# Patient Record
Sex: MALE | Race: WHITE | NOT HISPANIC OR LATINO | Employment: OTHER | URBAN - METROPOLITAN AREA
[De-identification: names, ages, dates, MRNs, and addresses within clinical notes are randomized per-mention and may not be internally consistent; named-entity substitution may affect disease eponyms.]

---

## 2017-06-20 ENCOUNTER — OFFICE VISIT (OUTPATIENT)
Dept: SLEEP MEDICINE | Facility: CLINIC | Age: 72
End: 2017-06-20
Payer: COMMERCIAL

## 2017-06-20 VITALS
BODY MASS INDEX: 42.66 KG/M2 | WEIGHT: 315 LBS | HEIGHT: 72 IN | DIASTOLIC BLOOD PRESSURE: 85 MMHG | HEART RATE: 57 BPM | OXYGEN SATURATION: 94 % | SYSTOLIC BLOOD PRESSURE: 158 MMHG

## 2017-06-20 DIAGNOSIS — G47.33 OSA (OBSTRUCTIVE SLEEP APNEA): Primary | ICD-10-CM

## 2017-06-20 PROCEDURE — 99214 OFFICE O/P EST MOD 30 MIN: CPT | Performed by: FAMILY MEDICINE

## 2017-06-20 NOTE — MR AVS SNAPSHOT
After Visit Summary   6/20/2017    Quintin Paniagua    MRN: [id number]           Patient Information     Date Of Birth          1945        Visit Information        Provider Department      6/20/2017 3:30 PM Boris Zazueta MD [facility] [place]        Today's Diagnoses     ILIA (obstructive sleep apnea)    -  1      Care Instructions      Your BMI is Body mass index is 47.6 kg/(m^2).  Weight management is a personal decision.  If you are interested in exploring weight loss strategies, the following discussion covers the approaches that may be successful. Body mass index (BMI) is one way to tell whether you are at a healthy weight, overweight, or obese. It measures your weight in relation to your height.  A BMI of 18.5 to 24.9 is in the healthy range. A person with a BMI of 25 to 29.9 is considered overweight, and someone with a BMI of 30 or greater is considered obese. More than two-thirds of American adults are considered overweight or obese.  Being overweight or obese increases the risk for further weight gain. Excess weight may lead to heart disease and diabetes.  Creating and following plans for healthy eating and physical activity may help you improve your health.  Weight control is part of healthy lifestyle and includes exercise, emotional health, and healthy eating habits. Careful eating habits lifelong are the mainstay of weight control. Though there are significant health benefits from weight loss, long-term weight loss with diet alone may be very difficult to achieve- studies show long-term success with dietary management in less than 10% of people. Attaining a healthy weight may be especially difficult to achieve in those with severe obesity. In some cases, medications, devices and surgical management might be considered.  What can you do?  If you are overweight or obese and are interested in methods for weight loss, you should discuss this with your provider.      Consider reducing daily calorie intake by 500 calories.     Keep a food journal.     Avoiding skipping meals, consider cutting portions instead.    Diet combined with exercise helps maintain muscle while optimizing fat loss. Strength training is particularly important for building and maintaining muscle mass. Exercise helps reduce stress, increase energy, and improves fitness. Increasing exercise without diet control, however, may not burn enough calories to loose weight.       Start walking three days a week 10-20 minutes at a time    Work towards walking thirty minutes five days a week     Eventually, increase the speed of your walking for 1-2 minutes at time    In addition, we recommend that you review healthy lifestyles and methods for weight loss available through the National Institutes of Health patient information sites:  http://win.niddk.nih.gov/publications/index.htm    And look into health and wellness programs that may be available through your health insurance provider, employer, local community center, or ella club.    Weight management plan: Patient was referred to their PCP to discuss a diet and exercise plan.      Your Body mass index is 47.6 kg/(m^2).  Weight management is a personal decision.  If you are interested in exploring weight loss strategies, the following discussion covers the approaches that may be successful. Body mass index (BMI) is one way to tell whether you are at a healthy weight, overweight, or obese. It measures your weight in relation to your height.  A BMI of 18.5 to 24.9 is in the healthy range. A person with a BMI of 25 to 29.9 is considered overweight, and someone with a BMI of 30 or greater is considered obese. More than two-thirds of American adults are considered overweight or obese.  Being overweight or obese increases the risk for further weight gain. Excess weight may lead to heart disease and diabetes.  Creating and following plans for healthy eating and  physical activity may help you improve your health.  Weight control is part of healthy lifestyle and includes exercise, emotional health, and healthy eating habits. Careful eating habits lifelong are the mainstay of weight control. Though there are significant health benefits from weight loss, long-term weight loss with diet alone may be very difficult to achieve- studies show long-term success with dietary management in less than 10% of people. Attaining a healthy weight may be especially difficult to achieve in those with severe obesity. In some cases, medications, devices and surgical management might be considered.  What can you do?  If you are overweight or obese and are interested in methods for weight loss, you should discuss this with your provider.     Consider reducing daily calorie intake by 500 calories.     Keep a food journal.     Avoiding skipping meals, consider cutting portions instead.    Diet combined with exercise helps maintain muscle while optimizing fat loss. Strength training is particularly important for building and maintaining muscle mass. Exercise helps reduce stress, increase energy, and improves fitness. Increasing exercise without diet control, however, may not burn enough calories to loose weight.       Start walking three days a week 10-20 minutes at a time    Work towards walking thirty minutes five days a week     Eventually, increase the speed of your walking for 1-2 minutes at time    In addition, we recommend that you review healthy lifestyles and methods for weight loss available through the National Institutes of Health patient information sites:  http://win.niddk.nih.gov/publications/index.htm    And look into health and wellness programs that may be available through your health insurance provider, employer, local community center, or ella club.    Weight management plan: Patient was referred to their PCP to discuss a diet and exercise plan.            Follow-ups after your  visit        Follow-up notes from your care team     Return in 1 year (on 6/20/2018) for PAP follow up.      Who to contact     If you have questions or need follow up information about today's clinic visit or your schedule please contact [facility] directly at [phone].  Normal or non-critical lab and imaging results will be communicated to you by MyChart, letter or phone within 4 business days after the clinic has received the results. If you do not hear from us within 7 days, please contact the clinic through [employer] or phone. If you have a critical or abnormal lab result, we will notify you by phone as soon as possible.  Submit refill requests through [employer] or call your pharmacy and they will forward the refill request to us. Please allow 3 business days for your refill to be completed.          Additional Information About Your Visit        MyChart Information     [employer] gives you secure access to your electronic health record. If you see a primary care provider, you can also send messages to your care team and make appointments. If you have questions, please call your primary care clinic.  If you do not have a primary care provider, please call [phone] and they will assist you.        Care EveryWhere ID     This is your Care EveryWhere ID. This could be used by other organizations to access your [place] medical records  [id number]        Your Vitals Were     Pulse Height Pulse Oximetry BMI (Body Mass Index)          57 1.829 m (6') 94% 47.6 kg/m2         Blood Pressure from Last 3 Encounters:   06/20/17 158/85   05/31/16 157/75   03/31/16 189/82    Weight from Last 3 Encounters:   06/20/17 (!) 159.2 kg (351 lb)   05/31/16 (!) 152 kg (335 lb 3.2 oz)   03/31/16 (!) 157.4 kg (347 lb)              We Performed the Following     Sleep Comprehensive DME        Primary Care Provider Office Phone # Fax #    Steven Duane Semmler, [phone]       [facility]   [address] [address]        Equal Access to Services     LONDONJESUS MANUEL MARISELA : Hadii milagro carrizales manasdarlene Christopherali, walosda lukitkaelynha, qalandonta kajose guadalupedavid natoinneymardavid, esperanza alvaradobyronkaykay houser. So [facility] [phone].    ATENCIÓN: Si habla español, tiene a ascencio disposición servicios gratuitos de asistencia lingüística. [facility] [phone].    We comply with applicable federal civil rights laws and [place] laws. We do not discriminate on the basis of race, color, national origin, age, disability sex, sexual orientation or gender identity.            Thank you!     Thank you for choosing [facility] [place]  for your care. Our goal is always to provide you with excellent care. Hearing back from our patients is one way we can continue to improve our services. Please take a few minutes to complete the written survey that you may receive in the mail after your visit with us. Thank you!             Your Updated Medication List - Protect others around you: Learn how to safely use, store and throw away your medicines at www.disposemymeds.org.          This list is accurate as of: 6/20/17 11:59 PM.  Always use your most recent med list.                   Brand Name Dispense Instructions for use Diagnosis    * amiodarone 200 MG tablet    PACERONE/CODARONE     Take 200 mg by mouth        * amiodarone 200 MG tablet    PACERONE/CODARONE          * amLODIPine 5 MG tablet    NORVASC     Take 5 mg by mouth        * amLODIPine 5 MG tablet    NORVASC          aspirin EC 81 MG EC tablet      Take 81 mg by mouth        atorvastatin 80 MG tablet    LIPITOR     Take 80 mg by mouth        BYDUREON 2 MG pen   Generic drug:  exenatide ER           cloNIDine 0.2 MG tablet    CATAPRES     Take 0.2 mg by mouth        finasteride 5 MG tablet    PROSCAR     Take 5 mg by mouth        furosemide 20 MG tablet    LASIX     Take 20 mg by mouth        insulin glargine 100 UNIT/ML injection    LANTUS     Inject 46 units  subcutaneous twice daily at 8am and 8pm. E11.65        insulin pen needle 31G X 8 MM      For administering insulin at home.        isosorbide mononitrate 60 MG 24 hr tablet    IMDUR     Take 60 mg by mouth        liraglutide 18 MG/3ML soln    VICTOZA     Inject 1.2 mg Subcutaneous        lisinopril 20 MG tablet    PRINIVIL/ZESTRIL     Take 20 mg by mouth        metoprolol 100 MG tablet    LOPRESSOR     Take 100 mg by mouth        Miconazole Nitrate 2 % ointment      Apply to both groins and abdominal fold twice a day        nitroglycerin 0.4 MG sublingual tablet    NITROSTAT     Place 0.4 mg under the tongue        NovoLOG FLEXPEN 100 UNIT/ML injection   Generic drug:  insulin aspart           order for DME      Equipment being ordered: CPAP Patient Quintin Paniagua was set up at [facility]  on April 7, 2016. Patient received a Resmed AirSense 10 CPAP. Pressures were set at 13 cm H2O.   Patient?s ramp is 5 cm H2O for Off and FLEX/EPR is 2.  Patient received a Resmed Mask name: GREEN FX  Pillow mask Size Medium, heated tubing and heated humidifier.  Patient is enrolled in the STM Program and does need to meet compliance. Patient has a follow up on MAY 31, 2016 with Dr. Zazueta.        sertraline 50 MG tablet    ZOLOFT     Take 50 mg by mouth        tamsulosin 0.4 MG capsule    FLOMAX     Take 0.4 mg by mouth        warfarin 10 MG tablet    COUMADIN          * Notice:  This list has 4 medication(s) that are the same as other medications prescribed for you. Read the directions carefully, and ask your doctor or other care provider to review them with you.

## 2017-06-20 NOTE — NURSING NOTE
Chief Complaint   Patient presents with     CPAP Follow Up     Follow up for C-Pap compliance.       Initial /76  Pulse 57  Ht 1.829 m (6')  Wt (!) 159.2 kg (351 lb)  SpO2 94%  BMI 47.6 kg/m2 Estimated body mass index is 47.6 kg/(m^2) as calculated from the following:    Height as of this encounter: 1.829 m (6').    Weight as of this encounter: 159.2 kg (351 lb).  Medication Reconciliation: complete

## 2017-06-20 NOTE — PATIENT INSTRUCTIONS

## 2017-06-24 NOTE — PROGRESS NOTES
Obstructive Sleep Apnea - PAP Follow-Up Visit:    Chief Complaint   Patient presents with     CPAP Follow Up     Follow up for C-Pap compliance.       Quintin Paniagua comes in today for annual follow-up of severe ILIA treated with CPAP of 13 cm H2O.  Pertinent PMHx of atrial fibrillation (warfarin, amiodarone), s/p aortic valve replacement, s/p unilateral nephrectomy for RCC, CKD, CAD, DM (presumed type 2).     Overall, doing well with his CPAP and has no concerns today.     CPAP download from 5/21/2017 - 6/19/2017 on set pressure 13 cm H2O.  Average daily usage of 9:42.  AHI 0.8.     Prior Sleep Testing:  3/12/2010 - Split-night PSG.  Weight 335 lbs, RDI 53.4, bravo desat 89%, CPAP 9 effective      Problem List:  Patient Active Problem List    Diagnosis Date Noted     Anemia 07/27/2014     Priority: Medium     Endocarditis 07/25/2014     Priority: Medium     Streptococcus agalactiae sero group B 7/27/2014        ILIA (obstructive sleep apnea)- moderate- severe (AHI 29) 07/25/2014     Priority: Medium     Diagnosed 2010 [facility] (335#). AHI 29.3, RDI 53, Lo2 89%. CPAP 12cm effective lateral REM       Sepsis (H) 07/25/2014     Priority: Medium     Problem list name updated by automated process. Provider to review       Atrial fibrillation (H)      Paroxysmal atrial fibrillation. Hospitalized [facility] 7/04 with rapid ventricular response. paroxysmal atrial fibrillation with rapid ventricular response during hospitalization for UTI at [facility] 7/2013. S/p cardioversion 7/14.        CAD (coronary artery disease) 08/26/2013     cardiac arrest this summer 2013 hospitalized at [facility]. S/p pacemaker defibrillator, 80% LAD stenosis s/p stenting 6/13       Hyperlipidemia 08/22/2013     Essential hypertension 08/05/2013     Automatic implantable cardioverter-defibrillator in situ 07/05/2013     Overview: iKONVERSE dual   and model: [email or web address] Energen E142  Date of implant: 7/5/13  Indication for device:  cardiac arrest  Problem list name updated by automated process. Provider to review       Edema 11/19/2012     CKD (chronic kidney disease) stage 3, GFR 30-59 ml/min 11/14/2012     History of kidney cancer 11/14/2012     Renal cell (clear cell) Tiny class II, S/p right nephrectomy       S/P aortic valve replacement 11/14/2012     Aortic insufficiency, secondary to bicuspid aortic valve, status post aortic valve replacement 9/2004       Morbid obesity (H) 11/14/2012     HTN (hypertension) 11/14/2012     Diabetes mellitus, type 2 (H) 11/14/2012     Septic arthritis of knee (H) 11/14/2012     right TKA complicated by MRSA and enterococcus infection and MRSA bacteremia, treated with two stage procedure with stage one on 11/2/2012, but had to go back to the or 3 days later for continued drainage, treated with vancomycin but changed to Daptomycin due to worsening renal function with planned antibiotics until 12/20/2012, s/p stage 2 done redo TKA 1/2013       External hemorrhoids 10/03/2012     Encounter for counseling 09/24/2012     Overview:   Patient has identified Health Care Agent(s): Yes  Add Health Care Agents: Yes    Health Care Agent(s):  Primary Health Care Agent: Kimberly Guan Relationship: wife Phone: [phone]   Secondary Health Care Agent:  Relationship:  Phone:    Conservator:  Relationship:  Phone:    Guardian: Relationship:  Phone:      Patient has Advance Care Plan Documents (Health Care Directive, POLST): Yes    Advance Care Plan Documents:  POLST Form     Patient has identified Specific Treatment Preferences: Yes   Specific Treatment Preferences: a.) Code Status:  CPR/Attempt Resuscitation        Hyperplasia of prostate without lower urinary tract symptoms (LUTS) 10/10/2007          /85  Pulse 57  Ht 1.829 m (6')  Wt (!) 159.2 kg (351 lb)  SpO2 94%  BMI 47.6 kg/m2    Impression/Plan:    1.)  Severe ILIA, treated with CPAP 13 cm H2O   - Appears well controlled on current CPAP settings  per patient and CPAP download   - Continue on current settings     Quintin Paniagua will follow up in about 1 year(s).     Twenty-five minutes spent with patient, all of which were spent face-to-face counseling, consulting, coordinating plan of care.      Boris Zazueta MD, MD    CC:  Semmler, Steven Duane

## 2018-04-13 ENCOUNTER — NURSE TRIAGE (OUTPATIENT)
Dept: NURSING | Facility: CLINIC | Age: 73
End: 2018-04-13

## 2018-04-14 NOTE — TELEPHONE ENCOUNTER
Reason for Disposition    Sensation of something still in the wound  (i.e., needle broke off)    Protocols used: NEEDLESTICK-ADULT-AH  After giving himself Novolog insulin, the needle couldn't be found.  Patient doesn't feel any discomfort.  He doesn't want to come in, but if he becomes uncomfortable he will.  Shelbi Mullen RN  [facility]

## 2018-11-12 DIAGNOSIS — I10 ESSENTIAL HYPERTENSION: Chronic | ICD-10-CM

## 2018-11-12 DIAGNOSIS — G47.33 OSA (OBSTRUCTIVE SLEEP APNEA): Primary | Chronic | ICD-10-CM

## 2019-11-06 ENCOUNTER — HEALTH MAINTENANCE LETTER (OUTPATIENT)
Age: 74
End: 2019-11-06

## 2020-01-14 RX ORDER — ISOSORBIDE MONONITRATE 60 MG/1
60 TABLET, EXTENDED RELEASE ORAL
Status: ON HOLD | COMMUNITY
Start: 2017-02-07 | End: 2020-01-20

## 2020-01-14 RX ORDER — FINASTERIDE 5 MG/1
5 TABLET, FILM COATED ORAL
Status: ON HOLD | COMMUNITY
Start: 2016-09-28 | End: 2020-01-20

## 2020-01-14 RX ORDER — AMLODIPINE BESYLATE 5 MG/1
5 TABLET ORAL
Status: ON HOLD | COMMUNITY
Start: 2016-09-28 | End: 2020-01-20

## 2020-01-14 RX ORDER — PENICILLIN V POTASSIUM 500 MG/1
TABLET, FILM COATED ORAL
COMMUNITY
Start: 2016-10-12

## 2020-01-14 RX ORDER — WARFARIN SODIUM 5 MG/1
TABLET ORAL
COMMUNITY
Start: 2017-05-09

## 2020-01-14 RX ORDER — NITROGLYCERIN 0.4 MG/1
0.4 TABLET SUBLINGUAL
Status: ON HOLD | COMMUNITY
Start: 2017-01-17 | End: 2020-01-20

## 2020-01-14 RX ORDER — TAMSULOSIN HYDROCHLORIDE 0.4 MG/1
0.4 CAPSULE ORAL
COMMUNITY
Start: 2016-09-28

## 2020-01-14 RX ORDER — NYSTATIN 100000/ML
SUSPENSION, ORAL (FINAL DOSE FORM) ORAL
COMMUNITY
Start: 2016-10-12

## 2020-01-14 RX ORDER — BUMETANIDE 2 MG/1
4 TABLET ORAL DAILY
COMMUNITY

## 2020-01-14 RX ORDER — FLUTICASONE PROPIONATE 50 MCG
1 SPRAY, SUSPENSION (ML) NASAL
Status: ON HOLD | COMMUNITY
Start: 2017-01-17 | End: 2020-01-20

## 2020-01-14 RX ORDER — ACETAMINOPHEN 325 MG/1
325-650 TABLET ORAL
COMMUNITY
Start: 2012-09-19

## 2020-01-14 RX ORDER — CLONIDINE HYDROCHLORIDE 0.2 MG/1
0.2 TABLET ORAL
COMMUNITY
Start: 2017-04-10

## 2020-01-14 RX ORDER — LISINOPRIL 20 MG/1
20 TABLET ORAL
Status: ON HOLD | COMMUNITY
Start: 2016-09-28 | End: 2020-01-20

## 2020-01-14 RX ORDER — FUROSEMIDE 20 MG
20 TABLET ORAL
COMMUNITY
Start: 2016-09-28 | End: 2020-01-14

## 2020-01-14 RX ORDER — ATORVASTATIN CALCIUM 80 MG/1
80 TABLET, FILM COATED ORAL
Status: ON HOLD | COMMUNITY
Start: 2017-04-27 | End: 2020-01-20

## 2020-01-17 ENCOUNTER — ANESTHESIA EVENT (OUTPATIENT)
Dept: SURGERY | Facility: CLINIC | Age: 75
End: 2020-01-17
Payer: COMMERCIAL

## 2020-01-17 ASSESSMENT — ENCOUNTER SYMPTOMS: DYSRHYTHMIAS: 1

## 2020-01-17 NOTE — ANESTHESIA PREPROCEDURE EVALUATION
Anesthesia Pre-Procedure Evaluation    Patient: Quintin Paniagua   MRN: [id number] : 1945          Preoperative Diagnosis: Cataract [H26.9]    Procedure(s):  Cataract Removal with Implant    Past Medical History:   Diagnosis Date     Anemia due to blood loss, acute 2012     Calculus in bladder 2012     Cardiac arrest (H) 2013    Overview:  S/P CPR and defibrillation with an AED with ROSC.      Sepsis 2012    infected right TKA     UTI (urinary tract infection) 2013     Past Surgical History:   Procedure Laterality Date     BACK SURGERY      ,      CARDIAC SURGERY      aortic valve replacement     COLONOSCOPY  9/15/2011    Procedure:COMBINED COLONOSCOPY, REMOVE TUMOR/POLYP/LESION BY SNARE; Surgeon:MADINA NELSON; Location:[facility]     GENITOURINARY SURGERY      s/p right nephrectomy     ORTHOPEDIC SURGERY  0890-2113    knee replacement, complicated by infection       Anesthesia Evaluation     . Pt has had prior anesthetic.            ROS/MED HX    ENT/Pulmonary:     (+)sleep apnea, uses CPAP , . .    Neurologic:       Cardiovascular:     (+) Dyslipidemia, hypertension--CAD, --. : . . . :. dysrhythmias a-fib, .       METS/Exercise Tolerance:     Hematologic:     (+) Anemia, -      Musculoskeletal:   (+) arthritis,  -       GI/Hepatic:         Renal/Genitourinary:     (+) chronic renal disease, type: CRI, Other Renal/ Genitourinary, history of kidney cancer      Endo:     (+) type I DM, Obesity, .      Psychiatric:         Infectious Disease:         Malignancy:         Other:                          Physical Exam  Normal systems: cardiovascular, pulmonary and dental    Airway   Mallampati: I  TM distance: >3 FB  Neck ROM: full    Dental     Cardiovascular   Rhythm and rate: regular and normal      Pulmonary    breath sounds clear to auscultation            Lab Results   Component Value Date    WBC 7.0 2014    HGB 13.3 2014    HCT 39.1 (L) 2014      09/01/2014    CRP 3.6 08/25/2014    SED 17 08/25/2014     09/01/2014    POTASSIUM 4.4 09/01/2014    CHLORIDE 104 09/01/2014    CO2 26 09/01/2014    BUN 23 09/01/2014    CR 1.21 09/01/2014     (H) 09/01/2014    MICHELE 8.3 (L) 09/01/2014    ALBUMIN 3.1 (L) 09/01/2014    PROTTOTAL 7.4 09/01/2014    ALT 24 09/01/2014    AST 17 09/01/2014    ALKPHOS 88 09/01/2014    BILITOTAL 0.5 09/01/2014    LIPASE 45 07/24/2014    PTT 44 (H) 08/16/2011    INR 2.45 (H) 09/01/2014    TSH 4.05 07/19/2004    T4 0.95 07/19/2004       Preop Vitals  BP Readings from Last 3 Encounters:   06/20/17 158/85   05/31/16 157/75   03/31/16 189/82    Pulse Readings from Last 3 Encounters:   06/20/17 57   05/31/16 60   03/31/16 60      Resp Readings from Last 3 Encounters:   09/04/14 18   09/01/14 8   08/25/14 16    SpO2 Readings from Last 3 Encounters:   06/20/17 94%   05/31/16 95%   03/31/16 93%      Temp Readings from Last 1 Encounters:   05/31/16 36.3  C (97.3  F) (Tympanic)    Ht Readings from Last 1 Encounters:   06/20/17 1.829 m (6')      Wt Readings from Last 1 Encounters:   06/20/17 (!) 159.2 kg (351 lb)    Estimated body mass index is 47.6 kg/m  as calculated from the following:    Height as of 6/20/17: 1.829 m (6').    Weight as of 6/20/17: 159.2 kg (351 lb).       Anesthesia Plan      History & Physical Review  History and physical reviewed and following examination; no interval change.    ASA Status:  4 .    NPO Status:  > 6 hours    Plan for MAC Reason for MAC:  Deep or markedly invasive procedure (G8)         Postoperative Care      Consents  Anesthetic plan, risks, benefits and alternatives discussed with:  Patient..                 KRIS Redmond CRNA

## 2020-01-17 NOTE — H&P
[facility]  TOTAL EYE CARE  [address]  [facility] [place] [address]  [phone]  Dept: [phone]    OPHTHALMOLOGY PRE-OPERATIVE  HISTORY AND PHYSICAL    DATE OF H/P:  2019    DATE OF SURGERY:  2020  PROCEDURE:  Procedure(s):  Cataract Removal with Implant, Right Eye  LENS IMPLANT:  [id number] +23.5  REFRACTIVE GOAL:  PL Sph  SURGEON:  Juan Manuel Pennington MD    ANESTHESIA:  TOPICAL / MAC    OR CASE REQUIREMENTS:    DEMOGRAPHICS:  Demographic Information on Quintin Paniagua:    Quintin Paniagua  Gender: male  : 1945  [address]  [facility] [id number] [phone] (home)     Medical Record: [id number]  Social Security Number: [id number]  Pharmacy: [facility][id number] [address] AT [address]  Primary Care Provider: Semmler, Steven Duane    Parent's names are: Data Unavailable (mother) and Data Unavailable (father).    Insurance: Payor: MEDICA / Plan: MED[employer] ADV[employer]IONS / Product Type: HMO /     OCULAR HISTORY:  Cataracts, each eye.    HISTORIES:  Past Medical History:   Diagnosis Date     Anemia due to blood loss, acute 2012     Calculus in bladder 2012     Cardiac arrest (H) 2013    Overview:  S/P CPR and defibrillation with an AED with ROSC.      Sepsis 2012    infected right TKA     UTI (urinary tract infection) 2013       Past Surgical History:   Procedure Laterality Date     BACK SURGERY      1978     CARDIAC SURGERY      aortic valve replacement     COLONOSCOPY  9/15/2011    Procedure:COMBINED COLONOSCOPY, REMOVE TUMOR/POLYP/LESION BY SNARE; Surgeon:MADINA NELSON; Location:[facility]     GENITOURINARY SURGERY      s/p right nephrectomy     ORTHOPEDIC SURGERY  8439-3711    knee replacement, complicated by infection       Family History   Problem Relation Age of Onset     Heart Disease Mother      Heart Disease Father        Social History     Tobacco Use      Smoking status: Former Smoker     Smokeless tobacco: Never Used   Substance Use Topics     Alcohol use: Yes       MEDICATIONS:  No current facility-administered medications for this encounter.      Current Outpatient Medications   Medication Sig     acetaminophen (TYLENOL) 325 MG tablet Take 325-650 mg by mouth     amLODIPine (NORVASC) 5 MG tablet Take 5 mg by mouth     atorvastatin (LIPITOR) 80 MG tablet Take 80 mg by mouth     bumetanide (BUMEX) 2 MG tablet Take 4 mg by mouth daily     cloNIDine (CATAPRES) 0.2 MG tablet Take 0.2 mg by mouth     finasteride (PROSCAR) 5 MG tablet Take 5 mg by mouth     fluticasone (FLONASE) 50 MCG/ACT nasal spray 1 spray     insulin aspart (NOVOLOG PEN) 100 UNIT/ML pen INJECT THREE TIMES DAILY BEFORE MEALS, 16 units before each meal plus more for a high carb meal (UP TO 70 UNITS DAILY)     insulin degludec (TRESIBA FLEXTOUCH) 200 UNIT/ML pen Use as directed.     isosorbide mononitrate (IMDUR) 60 MG 24 hr tablet Take 60 mg by mouth     lisinopril (PRINIVIL/ZESTRIL) 20 MG tablet Take 20 mg by mouth     nitroGLYcerin (NITROSTAT) 0.4 MG sublingual tablet Place 0.4 mg under the tongue     nystatin (MYCOSTATIN) 087068 UNIT/ML suspension Apply to groins and lower abdominal fold twice a day     penicillin V (VEETID) 500 MG tablet Use as directed.     sertraline (ZOLOFT) 50 MG tablet Take 50 mg by mouth     tamsulosin (FLOMAX) 0.4 MG capsule Take 0.4 mg by mouth     warfarin ANTICOAGULANT (COUMADIN) 5 MG tablet Take by mouth 5 mg on Monday's and Thursday's and 7.5 mg all other days or as directed     amiodarone (PACERONE/CODARONE) 200 MG tablet Take 200 mg by mouth     amiodarone (PACERONE/CODARONE) 200 MG tablet      amLODIPine (NORVASC) 5 MG tablet Take 5 mg by mouth     amLODIPine (NORVASC) 5 MG tablet      aspirin EC 81 MG tablet Take 81 mg by mouth     atorvastatin (LIPITOR) 80 MG tablet Take 80 mg by mouth     finasteride (PROSCAR) 5 MG tablet Take 5 mg by mouth     insulin  glargine (LANTUS) 100 UNIT/ML PEN Inject 46 units subcutaneous twice daily at 8am and 8pm. E11.65     insulin pen needle 31G X 8 MM For administering insulin at home.     isosorbide mononitrate (IMDUR) 60 MG 24 hr tablet Take 60 mg by mouth     liraglutide (VICTOZA) 18 MG/3ML soln Inject 1.2 mg Subcutaneous     lisinopril (PRINIVIL,ZESTRIL) 20 MG tablet Take 20 mg by mouth     metoprolol (LOPRESSOR) 100 MG tablet Take 100 mg by mouth     Miconazole Nitrate 2 % OINT Apply to both groins and abdominal fold twice a day     nitroglycerin (NITROSTAT) 0.4 MG SL tablet Place 0.4 mg under the tongue     NOVOLOG FLEXPEN 100 UNIT/ML soln      order for DME Equipment being ordered: CPAP Patient Quintin Paniagua was set up at [facility]  on April 7, 2016. Patient received a Resmed AirSense 10 CPAP. Pressures were set at 13 cm H2O.   Patient s ramp is 5 cm H2O for Off and FLEX/EPR is 2.  Patient received a Resmed Mask name: GREEN FX  Pillow mask Size Medium, heated tubing and heated humidifier.  Patient is enrolled in the STM Program and does need to meet compliance. Patient has a follow up on MAY 31, 2016 with Dr. Zazueta.       ALLERGIES:     Allergies   Allergen Reactions     Liraglutide      Other reaction(s): GI Upset       PERTINENT SYSTEMS REVIEW:    1. Yes: CAD, Valvular heart disease - Do you have a history of heart attack, stroke, stent, bypass or surgery on an artery in the head, neck, heart or legs?  2. No - Do you ever have any pain or discomfort in your chest?  3. No - Do you have a history of  Heart Failure?  4. No - Are you troubled by shortness of breath when walking: On the level, up a slight hill or at night?  5. No - Do you currently have a cold, bronchitis or other respiratory infection?  6. No - Do you have a cough, shortness of breath or wheezing?  7. No - Do you sometimes get pains in the calves of your legs when you walk?  8. No - Do you or anyone in your family have previous history of blood clots?  9.  Yes: on Coumadin - Do you or does anyone in your family have a serious bleeding problem such as prolonged bleeding following surgeries or cuts?  10. No - Have you ever had problems with anemia or been told to take iron pills?  11. No - Have you had any abnormal blood loss such as black, tarry or bloody stools, or abnormal vaginal bleeding?  12. No - Have you ever had a blood transfusion?  13. No - Have you or any of your relatives ever had problems with anesthesia?  14. No - Do you have sleep apnea, excessive snoring or daytime drowsiness?  15. Yes: s/p AVR - Do you have any prosthetic heart valves?  16. Yes: knee replacement - Do you have prosthetic joints?    EXAMINATION:  Vitals were reviewed                     Vison:  Va, right - 20/200, left - 20/100;   BAT, left - 20/200;  HEENT:  Cataract, otherwise unremarkable.  LUNGS:  Clear  CV:  Regular rate and rhythm without murmur  ABD:  Soft and nontender  NEURO:  Alert and nonfocal    IMPRESSION:  Patient cleared for ophthalmic surgery.  Low risk with monitored, light sedation.  I have assessed the patient's DVT risk, and no additional orders necessary.    PLAN:  Procedure(s):  Cataract Removal with Implant, Right Eye      Juan Manuel Pennington MD

## 2020-01-20 ENCOUNTER — ANESTHESIA (OUTPATIENT)
Dept: SURGERY | Facility: CLINIC | Age: 75
End: 2020-01-20
Payer: COMMERCIAL

## 2020-01-20 ENCOUNTER — HOSPITAL ENCOUNTER (OUTPATIENT)
Facility: CLINIC | Age: 75
Discharge: HOME | End: 2020-01-20
Attending: OPHTHALMOLOGY | Admitting: OPHTHALMOLOGY
Payer: COMMERCIAL

## 2020-01-20 VITALS
TEMPERATURE: 97.5 F | BODY MASS INDEX: 44.1 KG/M2 | HEART RATE: 57 BPM | DIASTOLIC BLOOD PRESSURE: 70 MMHG | OXYGEN SATURATION: 93 % | SYSTOLIC BLOOD PRESSURE: 153 MMHG | RESPIRATION RATE: 20 BRPM | HEIGHT: 71 IN | WEIGHT: 315 LBS

## 2020-01-20 LAB — GLUCOSE BLDC GLUCOMTR-MCNC: 107 MG/DL (ref 70–99)

## 2020-01-20 PROCEDURE — 25000125 ZZHC RX 250: Performed by: OPHTHALMOLOGY

## 2020-01-20 PROCEDURE — 71000022 ZZH RECOVERY CATRACT PACKAGE: Performed by: OPHTHALMOLOGY

## 2020-01-20 PROCEDURE — 25000125 ZZHC RX 250: Performed by: NURSE ANESTHETIST, CERTIFIED REGISTERED

## 2020-01-20 PROCEDURE — 25000128 H RX IP 250 OP 636: Performed by: OPHTHALMOLOGY

## 2020-01-20 PROCEDURE — 25000128 H RX IP 250 OP 636: Performed by: NURSE ANESTHETIST, CERTIFIED REGISTERED

## 2020-01-20 PROCEDURE — 37000012 ZZH ANESTHESIA CATARACT PACKAGE: Performed by: OPHTHALMOLOGY

## 2020-01-20 PROCEDURE — 25800030 ZZH RX IP 258 OP 636: Performed by: NURSE ANESTHETIST, CERTIFIED REGISTERED

## 2020-01-20 PROCEDURE — 36000025 ZZH CATARACT SURGICAL PACKAGE: Performed by: OPHTHALMOLOGY

## 2020-01-20 PROCEDURE — V2632 POST CHMBR INTRAOCULAR LENS: HCPCS | Performed by: OPHTHALMOLOGY

## 2020-01-20 PROCEDURE — 82962 GLUCOSE BLOOD TEST: CPT

## 2020-01-20 DEVICE — EYE IMP IOL AMO PCL TECNIS ZCB00 23.5: Type: IMPLANTABLE DEVICE | Site: EYE | Status: FUNCTIONAL

## 2020-01-20 RX ORDER — LIDOCAINE HYDROCHLORIDE 20 MG/ML
JELLY TOPICAL PRN
Status: DISCONTINUED | OUTPATIENT
Start: 2020-01-20 | End: 2020-01-20 | Stop reason: HOSPADM

## 2020-01-20 RX ORDER — PROPOFOL 10 MG/ML
INJECTION, EMULSION INTRAVENOUS PRN
Status: DISCONTINUED | OUTPATIENT
Start: 2020-01-20 | End: 2020-01-20

## 2020-01-20 RX ORDER — SODIUM CHLORIDE, SODIUM LACTATE, POTASSIUM CHLORIDE, CALCIUM CHLORIDE 600; 310; 30; 20 MG/100ML; MG/100ML; MG/100ML; MG/100ML
INJECTION, SOLUTION INTRAVENOUS CONTINUOUS
Status: DISCONTINUED | OUTPATIENT
Start: 2020-01-20 | End: 2020-01-20 | Stop reason: HOSPADM

## 2020-01-20 RX ORDER — SODIUM CHLORIDE, SODIUM LACTATE, POTASSIUM CHLORIDE, CALCIUM CHLORIDE 600; 310; 30; 20 MG/100ML; MG/100ML; MG/100ML; MG/100ML
INJECTION, SOLUTION INTRAVENOUS CONTINUOUS
Status: CANCELLED | OUTPATIENT
Start: 2020-01-20

## 2020-01-20 RX ORDER — PHENYLEPHRINE HYDROCHLORIDE 25 MG/ML
1 SOLUTION/ DROPS OPHTHALMIC
Status: COMPLETED | OUTPATIENT
Start: 2020-01-20 | End: 2020-01-20

## 2020-01-20 RX ORDER — LIDOCAINE 40 MG/G
CREAM TOPICAL
Status: DISCONTINUED | OUTPATIENT
Start: 2020-01-20 | End: 2020-01-20 | Stop reason: HOSPADM

## 2020-01-20 RX ORDER — CYCLOPENTOLATE HYDROCHLORIDE 10 MG/ML
1 SOLUTION/ DROPS OPHTHALMIC
Status: COMPLETED | OUTPATIENT
Start: 2020-01-20 | End: 2020-01-20

## 2020-01-20 RX ORDER — TROPICAMIDE 10 MG/ML
1 SOLUTION/ DROPS OPHTHALMIC
Status: COMPLETED | OUTPATIENT
Start: 2020-01-20 | End: 2020-01-20

## 2020-01-20 RX ORDER — PROPARACAINE HYDROCHLORIDE 5 MG/ML
SOLUTION/ DROPS OPHTHALMIC PRN
Status: DISCONTINUED | OUTPATIENT
Start: 2020-01-20 | End: 2020-01-20 | Stop reason: HOSPADM

## 2020-01-20 RX ADMIN — CYCLOPENTOLATE HYDROCHLORIDE 1 DROP: 10 SOLUTION/ DROPS OPHTHALMIC at 07:47

## 2020-01-20 RX ADMIN — TROPICAMIDE 1 DROP: 10 SOLUTION/ DROPS OPHTHALMIC at 07:47

## 2020-01-20 RX ADMIN — LIDOCAINE HYDROCHLORIDE 1 ML: 10 INJECTION, SOLUTION EPIDURAL; INFILTRATION; INTRACAUDAL; PERINEURAL at 08:23

## 2020-01-20 RX ADMIN — SODIUM CHLORIDE, POTASSIUM CHLORIDE, SODIUM LACTATE AND CALCIUM CHLORIDE: 600; 310; 30; 20 INJECTION, SOLUTION INTRAVENOUS at 08:23

## 2020-01-20 RX ADMIN — CYCLOPENTOLATE HYDROCHLORIDE 1 DROP: 10 SOLUTION/ DROPS OPHTHALMIC at 08:14

## 2020-01-20 RX ADMIN — PROPOFOL 20 MG: 10 INJECTION, EMULSION INTRAVENOUS at 08:59

## 2020-01-20 RX ADMIN — CYCLOPENTOLATE HYDROCHLORIDE 1 DROP: 10 SOLUTION/ DROPS OPHTHALMIC at 07:59

## 2020-01-20 RX ADMIN — PHENYLEPHRINE HYDROCHLORIDE 1 DROP: 25 SOLUTION/ DROPS OPHTHALMIC at 07:59

## 2020-01-20 RX ADMIN — TROPICAMIDE 1 DROP: 10 SOLUTION/ DROPS OPHTHALMIC at 08:13

## 2020-01-20 RX ADMIN — TROPICAMIDE 1 DROP: 10 SOLUTION/ DROPS OPHTHALMIC at 07:59

## 2020-01-20 RX ADMIN — PHENYLEPHRINE HYDROCHLORIDE 1 DROP: 25 SOLUTION/ DROPS OPHTHALMIC at 08:14

## 2020-01-20 RX ADMIN — PHENYLEPHRINE HYDROCHLORIDE 1 DROP: 25 SOLUTION/ DROPS OPHTHALMIC at 07:47

## 2020-01-20 ASSESSMENT — MIFFLIN-ST. JEOR: SCORE: 2200.96

## 2020-01-20 NOTE — OP NOTE
OPHTHALMOLOGY OPERATIVE NOTE    PATIENT: Quintin Paniagua  DATE OF SURGERY: 1/20/2020  PREOPERATIVE DIAGNOSIS:  Senile Nuclear Cataract, Right eye  POSTOPERATIVE DIAGNOSIS:  Senile Nuclear Cataract, Right eye  OPERATIVE PROCEDURE:  Phacoemulsification with placement of intraocular lens  SURGEON:  Juan Manuel Pennington MD  ANESTHESIA:  Topical / MAC  EBL:  None  SPECIMENS:  None  COMPLICATIONS:  None    PROCEDURE:  The patient was brought to the operating room at [facility].  The right eye was prepped and draped in the usual fashion for cataract surgery.  A wire lid speculum was inserted.  A super sharp blade was used to make a paracentesis at the 11 O'clock position.  The super sharp blade was used to make a partial thickness temporal groove, which was 3 mm in length.  0.8 mL of non-preserved epi-Shugarcaine was injected into the anterior chamber.  Viscoelastic was used to inflate the anterior chamber through a cannula.  A 2.5 mm microkeratome was used to make a temporal clear corneal incision in a two-plane fashion.  A cystotome needle and forceps were used to make a capsulorrhexis.  Hydrodissection and hydrodelineation were performed with Balance Salt Solution.  The lens was then phacoemulsified and removed without complications.  The cortical material was removed with bimanual irrigation and aspiration.  The capsular bag was filled with viscoelastic.  A posterior chamber intraocular lens, preselected and recorded, was folded and inserted into the capsular bag.  The viscoelastic was removed with the irrigation and aspiration tip.  Balanced Salt Solution with Vigamox, 150mg/0.1mL, was used to refill the anterior chamber.  The wounds were checked for water tightness and required no suture.  The wire lid speculum was removed.  The patient's right eye was cleaned and a drop of each post-operative drop was placed, followed by a ramsay shield.  The patient tolerated the procedure well, and  there were no complications.      Juan Manuel Pennington MD

## 2020-01-20 NOTE — ANESTHESIA POSTPROCEDURE EVALUATION
Patient: Quintin Paniagua    Procedure(s):  Cataract Removal with Implant    Diagnosis:Cataract [H26.9]  Diagnosis Additional Information: No value filed.    Anesthesia Type:  No value filed.    Note:  Anesthesia Post Evaluation    Patient location during evaluation: Bedside  Patient participation: Able to fully participate in evaluation  Level of consciousness: awake and alert  Pain management: adequate  multimodal analgesia used between 6 hours prior to anesthesia start to PACU dischargeAirway patency: patent  Cardiovascular status: acceptable  Respiratory status: acceptable  two or more mitigation strategies used for obstructive sleep apneaHydration status: acceptable  PONV: none     Anesthetic complications: None          Last vitals:  Vitals:    01/20/20 0728   BP: (!) 147/65   Resp: 20   Temp: 36.7  C (98  F)   SpO2: 93%         Electronically Signed By: KRIS Patten CRNA  January 20, 2020  9:20 AM

## 2020-01-20 NOTE — ANESTHESIA CARE TRANSFER NOTE
Patient: Quintin Paniagua    Procedure(s):  Cataract Removal with Implant    Diagnosis: Cataract [H26.9]  Diagnosis Additional Information: No value filed.    Anesthesia Type:   No value filed.     Note:  Airway :Room Air  Patient transferred to:Phase II  Handoff Report: Identifed the Patient, Identified the Reponsible Provider, Reviewed the pertinent medical history, Discussed the surgical course, Reviewed Intra-OP anesthesia mangement and issues during anesthesia, Set expectations for post-procedure period and Allowed opportunity for questions and acknowledgement of understanding      Vitals: (Last set prior to Anesthesia Care Transfer)    CRNA VITALS  1/20/2020 0850 - 1/20/2020 0920      1/20/2020             Pulse:  61    SpO2:  (!) 88 %                Electronically Signed By: KRIS Patten CRNA  January 20, 2020  9:20 AM

## 2020-01-30 ENCOUNTER — ANESTHESIA EVENT (OUTPATIENT)
Dept: SURGERY | Facility: CLINIC | Age: 75
End: 2020-01-30
Payer: COMMERCIAL

## 2020-01-30 ASSESSMENT — ENCOUNTER SYMPTOMS: DYSRHYTHMIAS: 1

## 2020-01-30 ASSESSMENT — LIFESTYLE VARIABLES: TOBACCO_USE: 1

## 2020-01-30 NOTE — ANESTHESIA PREPROCEDURE EVALUATION
Anesthesia Pre-Procedure Evaluation    Patient: Quitnin Paniagua   MRN: [id number] : 1945          Preoperative Diagnosis: Cataract [H26.9]    Procedure(s):  Cataract Removal with Implant    Past Medical History:   Diagnosis Date     Anemia due to blood loss, acute 2012     Calculus in bladder 2012     Cardiac arrest (H) 2013    Overview:  S/P CPR and defibrillation with an AED with ROSC.      Diabetes (H)      Hypertension      Kidney cancer, primary, with metastasis from kidney to other site (H)      Sepsis 2012    infected right TKA     UTI (urinary tract infection) 2013     Past Surgical History:   Procedure Laterality Date     BACK SURGERY      ,      CARDIAC SURGERY      aortic valve replacement     COLONOSCOPY  9/15/2011    Procedure:COMBINED COLONOSCOPY, REMOVE TUMOR/POLYP/LESION BY SNARE; Surgeon:MADINA NELSON; Location:[facility]     GENITOURINARY SURGERY      s/p right nephrectomy     ORTHOPEDIC SURGERY  [id number]    knee replacement, complicated by infection     PACEMAKER/ICD CHECK       PHACOEMULSIFICATION WITH STANDARD INTRAOCULAR LENS IMPLANT Right 2020    Procedure: Cataract Removal with Implant;  Surgeon: Juan Manuel Pennington MD;  Location: [facility]       Anesthesia Evaluation     . Pt has had prior anesthetic.            ROS/MED HX    ENT/Pulmonary:     (+)sleep apnea, ILIA risk factors hypertension, obese, tobacco use, Past use uses CPAP , . .    Neurologic:       Cardiovascular: Comment: Hx of cardiac arrest    (+) Dyslipidemia, hypertension--CAD, --. : . . . :ICD . dysrhythmias a-fib, valvular problems/murmurs s/p AVR:. Previous cardiac testing Echodate:14results:ECHO COMPLETE WITH OPTISON   Order: [id number]   Status: Final result   Visible to patient: No (Not Released) Next appt: None   Details       Reading Physician Reading Date Result Priority  Juan Manuel Rodriguez MD 2014     Narrative & Impression       Interpretation  Summary  There is a mechanical aortic valve. Left ventricular systolic function is   normal. The visual ejection fraction is estimated at 60-65%. The left   ventricle is normal in size. There is mild concentric left ventricular   hypertrophy. There is a pacemaker lead in the right ventricle. There is mild   biatrial enlargement. Pacer wire in right atrium The rhythm was paced. As   compared with the last study 8/26/2013, there has been a slight increase in   the mean systolic gradient across the aortic valve ( from mean 14 to 22 mmHg)   . There has been no other significant change since 8/26/2013.  PatientHeight: 72 in  PatientWeight: 306 lbs  SystolicPressure: 126 mmHg  DiastolicPressure: 58 mmHg  HeartRate: 60 bpm  BSA 2.6 m^2        Left Ventricle  The left ventricle is normal in size.  There is mild concentric left ventricular hypertrophy.  Left ventricular systolic function is normal.  The visual ejection fraction is estimated at 60-65%.  Septal wall motion abnormality may reflect pacemaker activation.  Septal motion is consistent with post-operative state.  There is no thrombus seen in the left ventricle.     Right Ventricle  The right ventricle is normal in structure, function and size.  There is a pacemaker lead in the right ventricle.     Atria  There is mild biatrial enlargement.  Pacer wire in right atrium.  There is no atrial shunt seen.     Mitral Valve  The mitral valve leaflets appear normal. There is no evidence of stenosis,   fluttering, or prolapse.  There is no mitral regurgitation noted.  There is no mitral valve stenosis.     Tricuspid Valve  Normal tricuspid valve.  The right ventricular systolic pressure is approximated at 22 mmHg plus the   right atrial pressure.  Right ventricle systolic pressure estimate normal.  There is trace tricuspid regurgitation.  There is no tricuspid stenosis.     Aortic Valve  No aortic regurgitation is present.  The mean AoV pressure gradient is 22 mmHg.  There is  a mechanical aortic valve.     Pulmonic Valve  The pulmonic valve is not well seen, but is grossly normal.  There is no pulmonic valvular regurgitation.  There is no pulmonic valvular stenosis.     Vessels  The aortic root is normal size.  Normal size ascending aorta.  The IVC is normal in size and reactivity with respiration, suggesting normal   central venous pressure.  The pulmonary artery is normal size.     Pericardium  The pericardium appears normal.  There is no pleural effusion.     Rhythm  The rhythm was paced.     Procedure  Complete Portable Echo Adult.  Contrast Optison.     MMode 2D Measurements & Calculations  RVDd: 5.3 cm  IVSd: 1.4 cm  LVIDd: 5.5 cm  LVIDs: 3.8 cm  LVPWd: 1.5 cm  LVPWs: 2.0 cm  FS: 31 %  LV mass(C)d: 361 grams  Ao root diam: 3.2 cm  LA dimension: 4.9 cm  LA/Ao: 1.5   LVOT diam: 2.1 cm  Doppler Measurements & Calculations  MV E point: 84 cm/sec  MV A point: 32 cm/sec  MV E/A: 2.6   MV dec time: 0.16 sec  Ao V2 max: 320 cm/sec  Ao max P mmHg  Ao V2 mean: 219 cm/sec  Ao mean P mmHg  Ao V2 VTI: 68 cm  KEN(I,D): 1.2 cm^2  KEN(V,D): 1.2 cm^2  LV V1 max: 107 cm/sec  LV V1 VTI: 24 cm  SV(LVOT): 83 ml  PA V2 max: 80 cm/sec  PA max PG: 3.0 mmHg  TR Max enrike: 237 cm/sec  TR Max P mmHg     Interpreting Physician: Juan Manuel Rodriguez, electronically signed on   2014 12:36:14        date: results:ECG reviewed date:14 results:Electronic ventricular pacemaker  -possibly demand type   Pacemaker ECG, No further analysis   INSUFFICIENT DATA   date: results:          METS/Exercise Tolerance:     Hematologic:     (+) Anemia, -      Musculoskeletal:   (+) arthritis,  -       GI/Hepatic: Comment: Hemorrhoids         Renal/Genitourinary: Comment: Hx of UTI  Hx of calculus in bladder   Hx of kidney cancer  Hyperplasia of prostate    (+) chronic renal disease, type: CRI, Other Renal/ Genitourinary, history of kidney cancer      Endo:     (+) type I DM, Obesity, .      Psychiatric:        "  Infectious Disease:         Malignancy:   (+) Malignancy History of Other  Other CA kidney with mets status post         Other: Comment: Left cataract                             Physical Exam  Normal systems: cardiovascular, pulmonary and dental    Airway   Mallampati: I  TM distance: >3 FB  Neck ROM: full    Dental     Cardiovascular   Rhythm and rate: regular and normal      Pulmonary    breath sounds clear to auscultation            Lab Results   Component Value Date    WBC 7.0 09/01/2014    HGB 13.3 09/01/2014    HCT 39.1 (L) 09/01/2014     09/01/2014    CRP 3.6 08/25/2014    SED 17 08/25/2014     09/01/2014    POTASSIUM 4.4 09/01/2014    CHLORIDE 104 09/01/2014    CO2 26 09/01/2014    BUN 23 09/01/2014    CR 1.21 09/01/2014     (H) 09/01/2014    MICHELE 8.3 (L) 09/01/2014    ALBUMIN 3.1 (L) 09/01/2014    PROTTOTAL 7.4 09/01/2014    ALT 24 09/01/2014    AST 17 09/01/2014    ALKPHOS 88 09/01/2014    BILITOTAL 0.5 09/01/2014    LIPASE 45 07/24/2014    PTT 44 (H) 08/16/2011    INR 2.45 (H) 09/01/2014    TSH 4.05 07/19/2004    T4 0.95 07/19/2004       Preop Vitals  BP Readings from Last 3 Encounters:   01/20/20 (!) 153/70   06/20/17 158/85   05/31/16 157/75    Pulse Readings from Last 3 Encounters:   01/20/20 57   06/20/17 57   05/31/16 60      Resp Readings from Last 3 Encounters:   01/20/20 20   09/04/14 18   09/01/14 8    SpO2 Readings from Last 3 Encounters:   01/20/20 93%   06/20/17 94%   05/31/16 95%      Temp Readings from Last 1 Encounters:   01/20/20 36.4  C (97.5  F)    Ht Readings from Last 1 Encounters:   01/20/20 1.803 m (5' 11\")      Wt Readings from Last 1 Encounters:   01/20/20 142.9 kg (315 lb)    Estimated body mass index is 43.93 kg/m  as calculated from the following:    Height as of 1/20/20: 1.803 m (5' 11\").    Weight as of 1/20/20: 142.9 kg (315 lb).       Anesthesia Plan      History & Physical Review  History and physical reviewed and following examination; no interval " change.    ASA Status:  4 .    NPO Status:  > 6 hours    Plan for MAC Reason for MAC:  Deep or markedly invasive procedure (G8)         Postoperative Care      Consents  Anesthetic plan, risks, benefits and alternatives discussed with:  Patient..                   KRIS Herrera CRNA

## 2020-02-05 NOTE — H&P
[facility]  TOTAL EYE CARE  [address]  [facility] [address]  [phone]  Dept: [phone]    OPHTHALMOLOGY PRE-OPERATIVE  HISTORY AND PHYSICAL    DATE OF H/P:  2020    DATE OF SURGERY:  February 10, 2020  PROCEDURE:  Procedure(s):  Cataract Removal with Implant, Left Eye  LENS IMPLANT:  [id number] +23.5  REFRACTIVE GOAL:  PL Sph  SURGEON:  Juan Manuel Pennington MD    ANESTHESIA:  TOPICAL / MAC    OR CASE REQUIREMENTS:    DEMOGRAPHICS:  Demographic Information on Quintin Paniagua:    Quintin Paniagua  Gender: male  : 1945  [address]  [facility] [id number] [phone] (home)     Medical Record: [id number]  Social Security Number: [id number]  Pharmacy: [facility][id number] [address] AT [address]  Primary Care Provider: Semmler, Steven Duane    Parent's names are: Data Unavailable (mother) and Data Unavailable (father).    Insurance: Payor: MEDICA / Plan: MED[employer] AD[employer]IONS / Product Type: HMO /     OCULAR HISTORY:  Cataracts, s/p IOL right eye.    HISTORIES:  Past Medical History:   Diagnosis Date     Anemia due to blood loss, acute 2012     Calculus in bladder 2012     Cardiac arrest (H) 2013    Overview:  S/P CPR and defibrillation with an AED with ROSC.      Diabetes (H)      Hypertension      Kidney cancer, primary, with metastasis from kidney to other site (H)      Sepsis 2012    infected right TKA     UTI (urinary tract infection) 2013       Past Surgical History:   Procedure Laterality Date     BACK SURGERY      1978     CARDIAC SURGERY      aortic valve replacement     COLONOSCOPY  9/15/2011    Procedure:COMBINED COLONOSCOPY, REMOVE TUMOR/POLYP/LESION BY SNARE; Surgeon:MADINA NELSON; Location:[facility]     GENITOURINARY SURGERY      s/p right nephrectomy     ORTHOPEDIC SURGERY  3312-7298    knee replacement, complicated by infection     PACEMAKER/ICD CHECK        PHACOEMULSIFICATION WITH STANDARD INTRAOCULAR LENS IMPLANT Right 1/20/2020    Procedure: Cataract Removal with Implant;  Surgeon: Juan Manuel Pennington MD;  Location: [facility]       Family History   Problem Relation Age of Onset     Heart Disease Mother      Heart Disease Father        Social History     Tobacco Use     Smoking status: Former Smoker     Smokeless tobacco: Never Used   Substance Use Topics     Alcohol use: Yes       MEDICATIONS:  No current facility-administered medications for this encounter.      Current Outpatient Medications   Medication Sig     acetaminophen (TYLENOL) 325 MG tablet Take 325-650 mg by mouth     aspirin EC 81 MG tablet Take 81 mg by mouth     atorvastatin (LIPITOR) 80 MG tablet Take 80 mg by mouth     bumetanide (BUMEX) 2 MG tablet Take 4 mg by mouth daily     cloNIDine (CATAPRES) 0.2 MG tablet Take 0.2 mg by mouth     finasteride (PROSCAR) 5 MG tablet Take 5 mg by mouth     insulin aspart (NOVOLOG PEN) 100 UNIT/ML pen INJECT THREE TIMES DAILY BEFORE MEALS, 16 units before each meal plus more for a high carb meal (UP TO 70 UNITS DAILY)     insulin degludec (TRESIBA FLEXTOUCH) 200 UNIT/ML pen Use as directed.     insulin pen needle 31G X 8 MM For administering insulin at home.     isosorbide mononitrate (IMDUR) 60 MG 24 hr tablet Take 60 mg by mouth     lisinopril (PRINIVIL,ZESTRIL) 20 MG tablet Take 20 mg by mouth     Miconazole Nitrate 2 % OINT Apply to both groins and abdominal fold twice a day     nitroglycerin (NITROSTAT) 0.4 MG SL tablet Place 0.4 mg under the tongue     NOVOLOG FLEXPEN 100 UNIT/ML soln      nystatin (MYCOSTATIN) 432293 UNIT/ML suspension Apply to groins and lower abdominal fold twice a day     order for DME Equipment being ordered: CPAP Patient Quintin Paniagua was set up at [facility]  on April 7, 2016. Patient received a CoreTrace AirSense 10 CPAP. Pressures were set at 13 cm H2O.   Patient s ramp is 5 cm H2O for Off and FLEX/EPR is 2.  Patient received a  Resmed Mask name: GREEN FX  Pillow mask Size Medium, heated tubing and heated humidifier.  Patient is enrolled in the STM Program and does need to meet compliance. Patient has a follow up on MAY 31, 2016 with Dr. Zazueta.     penicillin V (VEETID) 500 MG tablet Use as directed.     sertraline (ZOLOFT) 50 MG tablet Take 50 mg by mouth     tamsulosin (FLOMAX) 0.4 MG capsule Take 0.4 mg by mouth     warfarin ANTICOAGULANT (COUMADIN) 5 MG tablet Take by mouth 5 mg on Monday's and Thursday's and 7.5 mg all other days or as directed       ALLERGIES:     Allergies   Allergen Reactions     Liraglutide      Other reaction(s): GI Upset       PERTINENT SYSTEMS REVIEW:    1. Yes: CAD, Valvular heart disease - Do you have a history of heart attack, stroke, stent, bypass or surgery on an artery in the head, neck, heart or legs?  2. No - Do you ever have any pain or discomfort in your chest?  3. No - Do you have a history of  Heart Failure?  4. No - Are you troubled by shortness of breath when walking: On the level, up a slight hill or at night?  5. No - Do you currently have a cold, bronchitis or other respiratory infection?  6. No - Do you have a cough, shortness of breath or wheezing?  7. No - Do you sometimes get pains in the calves of your legs when you walk?  8. No - Do you or anyone in your family have previous history of blood clots?  9. Yes: on Coumadin - Do you or does anyone in your family have a serious bleeding problem such as prolonged bleeding following surgeries or cuts?  10. No - Have you ever had problems with anemia or been told to take iron pills?  11. No - Have you had any abnormal blood loss such as black, tarry or bloody stools, or abnormal vaginal bleeding?  12. No - Have you ever had a blood transfusion?  13. No - Have you or any of your relatives ever had problems with anesthesia?  14. No - Do you have sleep apnea, excessive snoring or daytime drowsiness?  15. Yes: s/p AVR - Do you have any prosthetic  heart valves?  16. Yes: knee replacement - Do you have prosthetic joints?    EXAMINATION:  Vitals were reviewed                     Vison:  Va, left - 20/100;   BAT, left - 20/200;  HEENT:  Cataract, otherwise unremarkable.  LUNGS:  Clear  CV:  Regular rate and rhythm without murmur  ABD:  Soft and nontender  NEURO:  Alert and nonfocal    IMPRESSION:  Patient cleared for ophthalmic surgery.  Low risk with monitored, light sedation.  I have assessed the patient's DVT risk, and no additional orders necessary.    PLAN:  Procedure(s):  Cataract Removal with Implant, Left Eye      Juan Manuel Pennington MD

## 2020-02-10 ENCOUNTER — ANESTHESIA (OUTPATIENT)
Dept: SURGERY | Facility: CLINIC | Age: 75
End: 2020-02-10
Payer: COMMERCIAL

## 2020-02-10 ENCOUNTER — HOSPITAL ENCOUNTER (OUTPATIENT)
Facility: CLINIC | Age: 75
Discharge: HOME | End: 2020-02-10
Attending: OPHTHALMOLOGY | Admitting: OPHTHALMOLOGY
Payer: COMMERCIAL

## 2020-02-10 VITALS
OXYGEN SATURATION: 95 % | BODY MASS INDEX: 43.26 KG/M2 | TEMPERATURE: 98.1 F | SYSTOLIC BLOOD PRESSURE: 140 MMHG | DIASTOLIC BLOOD PRESSURE: 64 MMHG | RESPIRATION RATE: 16 BRPM | WEIGHT: 309 LBS | HEIGHT: 71 IN

## 2020-02-10 PROCEDURE — 25800030 ZZH RX IP 258 OP 636: Performed by: NURSE ANESTHETIST, CERTIFIED REGISTERED

## 2020-02-10 PROCEDURE — 71000022 ZZH RECOVERY CATRACT PACKAGE: Performed by: OPHTHALMOLOGY

## 2020-02-10 PROCEDURE — 27210794 ZZH OR GENERAL SUPPLY STERILE: Performed by: OPHTHALMOLOGY

## 2020-02-10 PROCEDURE — 25000125 ZZHC RX 250: Performed by: NURSE ANESTHETIST, CERTIFIED REGISTERED

## 2020-02-10 PROCEDURE — 36000025 ZZH CATARACT SURGICAL PACKAGE: Performed by: OPHTHALMOLOGY

## 2020-02-10 PROCEDURE — 25000128 H RX IP 250 OP 636: Performed by: OPHTHALMOLOGY

## 2020-02-10 PROCEDURE — 25000125 ZZHC RX 250: Performed by: OPHTHALMOLOGY

## 2020-02-10 PROCEDURE — V2632 POST CHMBR INTRAOCULAR LENS: HCPCS | Performed by: OPHTHALMOLOGY

## 2020-02-10 PROCEDURE — 25000128 H RX IP 250 OP 636: Performed by: NURSE ANESTHETIST, CERTIFIED REGISTERED

## 2020-02-10 PROCEDURE — 37000012 ZZH ANESTHESIA CATARACT PACKAGE: Performed by: OPHTHALMOLOGY

## 2020-02-10 DEVICE — EYE IMP IOL AMO PCL TECNIS ZCB00 23.5: Type: IMPLANTABLE DEVICE | Site: EYE | Status: FUNCTIONAL

## 2020-02-10 RX ORDER — CYCLOPENTOLATE HYDROCHLORIDE 10 MG/ML
1 SOLUTION/ DROPS OPHTHALMIC
Status: COMPLETED | OUTPATIENT
Start: 2020-02-10 | End: 2020-02-10

## 2020-02-10 RX ORDER — LIDOCAINE 40 MG/G
CREAM TOPICAL
Status: DISCONTINUED | OUTPATIENT
Start: 2020-02-10 | End: 2020-02-10 | Stop reason: HOSPADM

## 2020-02-10 RX ORDER — PHENYLEPHRINE HYDROCHLORIDE 25 MG/ML
1 SOLUTION/ DROPS OPHTHALMIC
Status: COMPLETED | OUTPATIENT
Start: 2020-02-10 | End: 2020-02-10

## 2020-02-10 RX ORDER — TROPICAMIDE 10 MG/ML
1 SOLUTION/ DROPS OPHTHALMIC
Status: COMPLETED | OUTPATIENT
Start: 2020-02-10 | End: 2020-02-10

## 2020-02-10 RX ORDER — SODIUM CHLORIDE, SODIUM LACTATE, POTASSIUM CHLORIDE, CALCIUM CHLORIDE 600; 310; 30; 20 MG/100ML; MG/100ML; MG/100ML; MG/100ML
INJECTION, SOLUTION INTRAVENOUS CONTINUOUS
Status: DISCONTINUED | OUTPATIENT
Start: 2020-02-10 | End: 2020-02-10 | Stop reason: HOSPADM

## 2020-02-10 RX ORDER — LIDOCAINE HYDROCHLORIDE 20 MG/ML
JELLY TOPICAL PRN
Status: DISCONTINUED | OUTPATIENT
Start: 2020-02-10 | End: 2020-02-10 | Stop reason: HOSPADM

## 2020-02-10 RX ORDER — SODIUM CHLORIDE, SODIUM LACTATE, POTASSIUM CHLORIDE, CALCIUM CHLORIDE 600; 310; 30; 20 MG/100ML; MG/100ML; MG/100ML; MG/100ML
INJECTION, SOLUTION INTRAVENOUS CONTINUOUS
Status: CANCELLED | OUTPATIENT
Start: 2020-02-10

## 2020-02-10 RX ORDER — BALANCED SALT SOLUTION 6.4; .75; .48; .3; 3.9; 1.7 MG/ML; MG/ML; MG/ML; MG/ML; MG/ML; MG/ML
SOLUTION OPHTHALMIC PRN
Status: DISCONTINUED | OUTPATIENT
Start: 2020-02-10 | End: 2020-02-10 | Stop reason: HOSPADM

## 2020-02-10 RX ORDER — PROPARACAINE HYDROCHLORIDE 5 MG/ML
SOLUTION/ DROPS OPHTHALMIC PRN
Status: DISCONTINUED | OUTPATIENT
Start: 2020-02-10 | End: 2020-02-10 | Stop reason: HOSPADM

## 2020-02-10 RX ORDER — PROPOFOL 10 MG/ML
INJECTION, EMULSION INTRAVENOUS PRN
Status: DISCONTINUED | OUTPATIENT
Start: 2020-02-10 | End: 2020-02-10

## 2020-02-10 RX ADMIN — PHENYLEPHRINE HYDROCHLORIDE 1 DROP: 25 SOLUTION/ DROPS OPHTHALMIC at 08:16

## 2020-02-10 RX ADMIN — PHENYLEPHRINE HYDROCHLORIDE 1 DROP: 25 SOLUTION/ DROPS OPHTHALMIC at 08:11

## 2020-02-10 RX ADMIN — CYCLOPENTOLATE HYDROCHLORIDE 1 DROP: 10 SOLUTION/ DROPS OPHTHALMIC at 08:11

## 2020-02-10 RX ADMIN — TROPICAMIDE 1 DROP: 10 SOLUTION/ DROPS OPHTHALMIC at 08:10

## 2020-02-10 RX ADMIN — CYCLOPENTOLATE HYDROCHLORIDE 1 DROP: 10 SOLUTION/ DROPS OPHTHALMIC at 08:22

## 2020-02-10 RX ADMIN — LIDOCAINE HYDROCHLORIDE 0.1 ML: 10 INJECTION, SOLUTION EPIDURAL; INFILTRATION; INTRACAUDAL; PERINEURAL at 08:14

## 2020-02-10 RX ADMIN — PROPOFOL 20 MG: 10 INJECTION, EMULSION INTRAVENOUS at 09:25

## 2020-02-10 RX ADMIN — SODIUM CHLORIDE, POTASSIUM CHLORIDE, SODIUM LACTATE AND CALCIUM CHLORIDE: 600; 310; 30; 20 INJECTION, SOLUTION INTRAVENOUS at 08:13

## 2020-02-10 RX ADMIN — TROPICAMIDE 1 DROP: 10 SOLUTION/ DROPS OPHTHALMIC at 08:17

## 2020-02-10 RX ADMIN — PHENYLEPHRINE HYDROCHLORIDE 1 DROP: 25 SOLUTION/ DROPS OPHTHALMIC at 08:22

## 2020-02-10 RX ADMIN — CYCLOPENTOLATE HYDROCHLORIDE 1 DROP: 10 SOLUTION/ DROPS OPHTHALMIC at 08:26

## 2020-02-10 RX ADMIN — TROPICAMIDE 1 DROP: 10 SOLUTION/ DROPS OPHTHALMIC at 08:16

## 2020-02-10 ASSESSMENT — MIFFLIN-ST. JEOR: SCORE: 2173.74

## 2020-02-10 NOTE — OP NOTE
CATARACT OPERATIVE NOTE    PATIENT: Quintin Paniagua  DATE OF SURGERY: 2/10/2020  PREOPERATIVE DIAGNOSIS:  Senile Nuclear Cataract, Left eye  POSTOPERATIVE DIAGNOSIS:  Senile Nuclear Cataract, and intraoperative floppy iris syndrome, Left eye  OPERATIVE PROCEDURE:  Complex Phacoemulsification and placement of intraocular lens, requiring Malyugin Ring  SURGEON:  Juan Manuel Pennington MD  ANESTHESIA:  Topical / MAC  EBL:  None  SPECIMENS:  None  COMPLICATIONS:  None    PROCEDURE:  The patient was brought to the operating room at [facility].  The left eye was prepped and draped in the usual fashion for cataract surgery.  A wire lid speculum was inserted.  A super sharp blade was used to make a paracentesis at the 5 O'clock position.  The super sharp blade was used to make a partial thickness temporal groove, which was 3 mm in length.  0.8 mL of non-preserved epi-Shugarcaine was injected into the anterior chamber. Viscoelastic was used to inflate the anterior chamber through a cannula.  A 2.5 mm microkeratome was used to make a temporal clear corneal incision in a two-plane fashion.  Due to intraoperative floppy iris, a malyugin ring was inserted to dilate and secure the iris in the usual manner.  A cystotome needle and forceps were used to make a capsulorrhexis.  Hydrodissection and hydrodelineation were performed with Balance Salt Solution.  The lens was then phacoemulsified and removed without complications.  The cortical material was removed with bimanual irrigation and aspiration.  The capsular bag was filled with viscoelastic.  A posterior chamber intraocular lens, preselected and recorded, was folded and inserted into the capsular bag.  The malyugin ring was dis-inserted from the anterior chamber in the usual manner.  The viscoelastic was removed with the irrigation and aspiration tip.  Balanced Salt Solution with Vigamox, 150mg/0.1mL, was used to refill the anterior chamber.  The  wounds were checked for water tightness and required no suture.  The wire lid speculum was removed.  The patient's left eye was cleaned and a drop of each post-operative drop was placed, followed by a ramsay shield.  The patient tolerated the procedure well, and there were no complications.      Juan Manuel Pennington MD

## 2020-02-10 NOTE — ANESTHESIA POSTPROCEDURE EVALUATION
Patient: Quintin Paniagua    Procedure(s):  Cataract Removal with Implant    Diagnosis:Cataract [H26.9]  Diagnosis Additional Information: No value filed.    Anesthesia Type:  MAC    Note:  Anesthesia Post Evaluation    Patient location during evaluation: Bedside  Patient participation: Able to fully participate in evaluation  Level of consciousness: awake and alert  Pain management: adequate  multimodal analgesia used between 6 hours prior to anesthesia start to PACU dischargeAirway patency: patent  Cardiovascular status: acceptable  Respiratory status: acceptable  two or more mitigation strategies used for obstructive sleep apneaHydration status: acceptable  PONV: none     Anesthetic complications: None          Last vitals:  Vitals:    02/10/20 0801 02/10/20 0946   BP: 127/63 (!) 140/64   Resp: 20 16   Temp: 36.7  C (98.1  F)    SpO2: 96% 95%         Electronically Signed By: KRIS Patten CRNA  February 10, 2020  9:58 AM

## 2020-02-16 ENCOUNTER — HEALTH MAINTENANCE LETTER (OUTPATIENT)
Age: 75
End: 2020-02-16

## 2020-06-26 DIAGNOSIS — G47.33 OSA (OBSTRUCTIVE SLEEP APNEA): Primary | ICD-10-CM

## 2020-11-29 ENCOUNTER — HEALTH MAINTENANCE LETTER (OUTPATIENT)
Age: 75
End: 2020-11-29

## 2021-01-01 ENCOUNTER — HEALTH MAINTENANCE LETTER (OUTPATIENT)
Age: 76
End: 2021-01-01

## 2021-01-01 ENCOUNTER — DOCUMENTATION ONLY (OUTPATIENT)
Dept: SLEEP MEDICINE | Facility: CLINIC | Age: 76
End: 2021-01-01
Payer: COMMERCIAL

## 2021-01-01 ENCOUNTER — TELEPHONE (OUTPATIENT)
Dept: SLEEP MEDICINE | Facility: CLINIC | Age: 76
End: 2021-01-01
Payer: COMMERCIAL

## 2021-01-01 DIAGNOSIS — G47.33 OSA (OBSTRUCTIVE SLEEP APNEA): Primary | ICD-10-CM

## 2021-04-10 ENCOUNTER — HEALTH MAINTENANCE LETTER (OUTPATIENT)
Age: 76
End: 2021-04-10

## 2021-12-01 NOTE — TELEPHONE ENCOUNTER
Reason for Call:  Other call back    Detailed comments: patients wife called.  States that patient's cpap machiine is stating exceeds life message.  Patient has been scheduled for a new consult/est new care in February.  Needs to know next steps from provider until this appointment.  Please contact patient.  Thank you.    Phone Number Patient can be reached at: Home number on file [phone] (home)    Best Time: any    Can we leave a detailed message on this number? YES    Call taken on 12/1/2021 at 11:14 AM by Amaris Collins

## 2021-12-03 NOTE — PROGRESS NOTES
[facility] Recheck:  Patient getting messages that his machine is out of date.  Patient received his machine from [facility] sleep [facility]. Patient stated he had a CPAP machine that is about 10 years old. Patient will have his wife call me when she gets home. Wife call and stated they are get messages saying the CPAP is nearing it's end date.  Will put in an order for a new machine.

## 2021-12-03 NOTE — Clinical Note
Hi this patient machine is reading end of life and requests an order for a new CPAP machine.  His machine is older than five years.   Thanks Chip

## 2022-01-01 ENCOUNTER — VIRTUAL VISIT (OUTPATIENT)
Dept: SLEEP MEDICINE | Facility: CLINIC | Age: 77
End: 2022-01-01
Payer: COMMERCIAL

## 2022-01-01 ENCOUNTER — TELEPHONE (OUTPATIENT)
Dept: SLEEP MEDICINE | Facility: CLINIC | Age: 77
End: 2022-01-01
Payer: COMMERCIAL

## 2022-01-01 ENCOUNTER — DOCUMENTATION ONLY (OUTPATIENT)
Dept: SLEEP MEDICINE | Facility: CLINIC | Age: 77
End: 2022-01-01
Payer: COMMERCIAL

## 2022-01-01 VITALS — BODY MASS INDEX: 37.8 KG/M2 | HEIGHT: 71 IN | WEIGHT: 270 LBS

## 2022-01-01 VITALS — WEIGHT: 270 LBS | HEIGHT: 71 IN | BODY MASS INDEX: 37.8 KG/M2

## 2022-01-01 DIAGNOSIS — G47.33 OSA (OBSTRUCTIVE SLEEP APNEA): Primary | Chronic | ICD-10-CM

## 2022-01-01 PROCEDURE — 99213 OFFICE O/P EST LOW 20 MIN: CPT | Mod: 95 | Performed by: INTERNAL MEDICINE

## 2022-01-01 PROCEDURE — 99204 OFFICE O/P NEW MOD 45 MIN: CPT | Mod: 95 | Performed by: INTERNAL MEDICINE

## 2022-01-01 RX ORDER — CARVEDILOL 25 MG/1
1 TABLET ORAL
COMMUNITY
Start: 2020-12-11

## 2022-01-01 ASSESSMENT — SLEEP AND FATIGUE QUESTIONNAIRES
HOW LIKELY ARE YOU TO NOD OFF OR FALL ASLEEP WHILE SITTING AND TALKING TO SOMEONE: WOULD NEVER DOZE
HOW LIKELY ARE YOU TO NOD OFF OR FALL ASLEEP WHILE SITTING INACTIVE IN A PUBLIC PLACE: WOULD NEVER DOZE
HOW LIKELY ARE YOU TO NOD OFF OR FALL ASLEEP WHEN YOU ARE A PASSENGER IN A CAR FOR AN HOUR WITHOUT A BREAK: SLIGHT CHANCE OF DOZING
HOW LIKELY ARE YOU TO NOD OFF OR FALL ASLEEP WHILE WATCHING TV: HIGH CHANCE OF DOZING
HOW LIKELY ARE YOU TO NOD OFF OR FALL ASLEEP IN A CAR, WHILE STOPPED FOR A FEW MINUTES IN TRAFFIC: WOULD NEVER DOZE
HOW LIKELY ARE YOU TO NOD OFF OR FALL ASLEEP IN A CAR, WHILE STOPPED FOR A FEW MINUTES IN TRAFFIC: WOULD NEVER DOZE
HOW LIKELY ARE YOU TO NOD OFF OR FALL ASLEEP WHILE LYING DOWN TO REST IN THE AFTERNOON WHEN CIRCUMSTANCES PERMIT: HIGH CHANCE OF DOZING
HOW LIKELY ARE YOU TO NOD OFF OR FALL ASLEEP WHILE SITTING QUIETLY AFTER LUNCH WITHOUT ALCOHOL: SLIGHT CHANCE OF DOZING
HOW LIKELY ARE YOU TO NOD OFF OR FALL ASLEEP WHILE SITTING QUIETLY AFTER LUNCH WITHOUT ALCOHOL: WOULD NEVER DOZE
HOW LIKELY ARE YOU TO NOD OFF OR FALL ASLEEP WHILE SITTING AND TALKING TO SOMEONE: WOULD NEVER DOZE
HOW LIKELY ARE YOU TO NOD OFF OR FALL ASLEEP WHILE SITTING INACTIVE IN A PUBLIC PLACE: WOULD NEVER DOZE
HOW LIKELY ARE YOU TO NOD OFF OR FALL ASLEEP WHILE WATCHING TV: SLIGHT CHANCE OF DOZING
HOW LIKELY ARE YOU TO NOD OFF OR FALL ASLEEP WHILE SITTING AND READING: SLIGHT CHANCE OF DOZING
HOW LIKELY ARE YOU TO NOD OFF OR FALL ASLEEP WHEN YOU ARE A PASSENGER IN A CAR FOR AN HOUR WITHOUT A BREAK: WOULD NEVER DOZE
HOW LIKELY ARE YOU TO NOD OFF OR FALL ASLEEP WHILE SITTING AND READING: SLIGHT CHANCE OF DOZING
HOW LIKELY ARE YOU TO NOD OFF OR FALL ASLEEP WHILE LYING DOWN TO REST IN THE AFTERNOON WHEN CIRCUMSTANCES PERMIT: HIGH CHANCE OF DOZING

## 2022-01-01 ASSESSMENT — MIFFLIN-ST. JEOR: SCORE: 1986.84

## 2022-01-11 NOTE — TELEPHONE ENCOUNTER
"RETURNED ANTHONY'S CALL AND SHE IS WANTING TO BRING IN THE CPAP MACHINE TO SEND OFF FOR REPAIRS AND USE ONE OF OUR LOANERS. WHEN CHECKING AIRVIEW THE ERROR MESSAGE \"\"Heating system fault, therapy will run without heating\" has been displayed on the device. Disconnect and reconnect the power supply. If problem persists, return device to [facility]. IS DISPLAYED. ANTHONY SCHEDULED AT THE [facility] SHOWROOM 01/17/2022 2PM. CONFIRMED LOCATION, TIME AND DATE WITH PT. E-MAILED [facility] APPT DETAILS. REACHED OUT TO KIANA PÉREZ TO CHECK FOR A LOANER MACHINE AS SHE IS FILLING IN TODAY  "

## 2022-01-17 NOTE — PROGRESS NOTES
"Patient is getting \"motor life exceeded\" warning on CPAP and \"Heating system fault\". Would like to proceed with sending machine into [facility] to review, to see if able to repair. Patient has upcoming appt with sleep provider. Recommend orders be placed for a new machine.      Swapna Phillip, DME Coordinator    "

## 2022-02-02 NOTE — PROGRESS NOTES
Pt reports taking sevelamir and jennifer иванadrián Ribeiro is a 76 year old who is being evaluated via a billable video visit.      How would you like to obtain your AVS? Davidhart  If the video visit is dropped, the invitation should be resent by: Send to e-mail at: [email or web address]  Will anyone else be joining your video visit? No      Video Start Time: 8:07 AM  Video-Visit Details    Type of service:  Video Visit    Video End Time:8:31 AM    Originating Location (pt. Location): Home    Distant Location (provider location):  [facility]     Platform used for Video Visit: [employer]     Chief complaint: Problems with CPAP, needs to reestablish care-patient seen with his wife    History of Present Illness: 76-year-old gentleman with history of renal cell carcinoma status post partial nephrectomy, atrial fibrillation, chronic kidney disease, coronary artery disease, type 2 diabetes and severe obstructive sleep apnea.  He is having problems with his machine and is currently having a loaner.  The loaner seems to be working okay.  Patient was hospitalized in December for GI bleed.  During the hospitalization he needed emergent cholecystectomy as well.  He then went to rehab and is now just been home for the last couple of days.  He slept very poorly in the hospital and during rehab.  He is sleeping a little bit better the last couple of days.  He uses a nasal pillow style mask.  He denies shortness of breath at night.  He sleeps on a regular flat bed with a couple pillows.  He denies symptoms of restless legs, but he does have foot cramps.  No sleepwalking, sleep talking or dream enactment behavior.    He started dialysis in the last year and has had a lot of water weight removed.  His weight is down significantly since the time of his sleep study.    He drinks about 2 half calf coffees a day.    [place] Sleepiness Scale   Sitting and reading: Slight chance of dozing   Watching TV: High chance of  dozing   Sitting, inactive in a public place (e.g. a theatre or a meeting): Would never doze   As a passenger in a car for an hour without a break: Slight chance of dozing   Lying down to rest in the afternoon when circumstances permit: High chance of dozing   Sitting and talking to someone: Would never doze   Sitting quietly after a lunch without alcohol: Slight chance of dozing   In a car, while stopped for a few minutes in traffic: Would never doze   Total score - [place]: 9   (Less than 10 normal)    Insomnia Severity Scale  JUDITH Total Score: 5  Total score categories:  0-7 = No clinically significant insomnia   8-14 = Subthreshold insomnia   15-21 = Clinical insomnia (moderate severity)  22-28 = Clinical insomnia (severe)        Past Medical History:   Diagnosis Date     Anemia due to blood loss, acute 11/19/2012     Calculus in bladder 4/9/2012     Cardiac arrest (H) 6/24/2013    Overview:  S/P CPR and defibrillation with an AED with ROSC.      Diabetes (H)      Hypertension      Kidney cancer, primary, with metastasis from kidney to other site (H)      Sepsis 11/14/2012    infected right TKA     UTI (urinary tract infection) 8/25/2013       Allergies   Allergen Reactions     Liraglutide      Other reaction(s): GI Upset       Current Outpatient Medications   Medication     acetaminophen (TYLENOL) 325 MG tablet     aspirin EC 81 MG tablet     atorvastatin (LIPITOR) 80 MG tablet     bumetanide (BUMEX) 2 MG tablet     finasteride (PROSCAR) 5 MG tablet     insulin aspart (NOVOLOG PEN) 100 UNIT/ML pen     insulin degludec (TRESIBA FLEXTOUCH) 200 UNIT/ML pen     insulin pen needle 31G X 8 MM     Miconazole Nitrate 2 % OINT     nitroglycerin (NITROSTAT) 0.4 MG SL tablet     NOVOLOG FLEXPEN 100 UNIT/ML soln     nystatin (MYCOSTATIN) 967263 UNIT/ML suspension     order for DME     penicillin V (VEETID) 500 MG tablet     sertraline (ZOLOFT) 50 MG tablet     tamsulosin (FLOMAX) 0.4 MG capsule     warfarin ANTICOAGULANT  "(COUMADIN) 5 MG tablet     cloNIDine (CATAPRES) 0.2 MG tablet     isosorbide mononitrate (IMDUR) 60 MG 24 hr tablet     lisinopril (PRINIVIL,ZESTRIL) 20 MG tablet     No current facility-administered medications for this visit.       Social History     Socioeconomic History     Marital status:      Spouse name: Estephania     Number of children: Not on file     Years of education: Not on file     Highest education level: Not on file   Occupational History     Occupation: Retired     Employer: [employer]     Comment: previous  and    Tobacco Use     Smoking status: Former Smoker     Smokeless tobacco: Never Used   Substance and Sexual Activity     Alcohol use: Yes     Drug use: No     Sexual activity: Not on file   Other Topics Concern     Parent/sibling w/ CABG, MI or angioplasty before 65F 55M? Not Asked   Social History Narrative     Not on file     Social Determinants of Health     Financial Resource Strain: Not on file   Food Insecurity: Not on file   Transportation Needs: Not on file   Physical Activity: Not on file   Stress: Not on file   Social Connections: Not on file   Intimate Partner Violence: Not on file   Housing Stability: Not on file       Family History   Problem Relation Age of Onset     Heart Disease Mother      Heart Disease Father        Review of Systems: Positve for dialysis, resolving pain post hospitalization, hearing loss, deconditioning, and per HPI, otherwise comprehensive review of systems is negative.    EXAM:  Ht 1.803 m (5' 11\")   Wt 122.5 kg (270 lb)   BMI 37.66 kg/m    GENERAL: Alert and no distress  EYES: Eyes grossly normal to inspection.  No discharge or erythema, or obvious scleral/conjunctival abnormalities.  RESP: No audible wheeze, cough, or visible cyanosis.  No visible retractions or increased work of breathing.    SKIN: Visible skin clear. No significant rash.  Has bruising on upper extremities.    NEURO: Cranial nerves " grossly intact.  Mentation and speech appropriate for age.  PSYCH: Mentation appears normal, affect normal, judgement and insight intact, normal speech and appearance well-groomed.       PSG 3/12/2010 [facility] Pr[facility]hjot  Weight 335 lbs, BMI 44.2  AHI 29.3, RDI 53.4, no REM sleep on diagnostic  Lowest O2 sat 89%  CPAP 12    ResMed CPAP 13.0 cmH2O last 30 day usage data:  93% of days with > 4 hours of use. 0/30 days with no use. Average use 576 minutes per day.   95%ile Leak 70.14 L/min.   AHI 9.23 events per hour.         ASSESSMENT:  76-year-old gentleman with severe obstructive sleep apnea, end-stage renal disease on dialysis, recent hospitalization and rehab stay.  He has a malfunctioning machine and is now using a loaner machine.  He is noting benefit from using CPAP and is sleeping better at home.  However, excessive leak is causing inadequate treatment.  He would benefit from updated CPAP supplies and perhaps a pressure change.  Ongoing treatment of severe obstructive sleep apnea is medically indicated.    PLAN:  Orders generated for recent replacement CPAP machine and updated supplies.  I personally discussed his situation with the CPAP technician from [facility].  Will be checking in on patient via [employer] in the next few weeks to ensure that leak has been resolved.  Will assess for whether he can have a pressure change at that time.  Encourage rehab with cautious weight management . Patient should have a follow-up with Dr. Zazueta and or myself in 3 months.  They are agreeable with this plan.    Patient seen virtually with his wife.      52 minutes spent on the date of the encounter doing chart review, history and exam, documentation and further activities per the note    Delfina Rodgers M.D.  Pulmonary/Critical Care/Sleep Medicine    [facility]   Floor 1, [address]   [address] [place] [address]. [place] [address]    Appointments: [phone]    The above note was dictated using voice recognition software and may include typographical errors. Please contact the author for any clarifications.

## 2022-02-02 NOTE — PATIENT INSTRUCTIONS
For general sleep health questions:   http://sleepeducation.org    For tips about PAP and COVID-19:  https://www.thoracic.org/patients/patient-resources/resources/covid-19-and-home-pap-therapy.pdf    For general info about COVID-19 including vaccines:  https://Worksoft.org/covid19        Continue PAP therapy every night, for all hours that you are sleeping (including naps.)  As always, try to get at least 8 hours of sleep or more each day, keep a regular sleep schedule, and avoid sleep deprivation. Avoid alcohol.    Reasons that you might need a change to your pressure therapy would be weight gain or loss, waking having inadvertently removed your PAP overnight, having previously felt refreshed by sleep with CPAP use and now waking un-refreshed, and return of daytime sleepiness. Also, the development of new medical problems  (such as heart failure, stroke, medications such as narcotics) can sometimes affect breathing at night and change your PAP therapy needs.    Please bring PAP with you if you are hospitalized.  If anticipating surgery be sure to discuss with your surgeon that you have sleep apnea and use PAP therapy.      Maintain your equipment as recommended which includes routine cleaning and replacement of supplies.      Call DME for any questions regarding supplies or maintenance.    [facility], [facility] [phone]      Do not drive on engage in potentially dangerous activities if feeling sleepy.    Please follow up in sleep clinic again in 3 months.        Tips for your PAP use-    Mask fitting tips  Mask fitting exercise:    To improve your mask seal and your mobility at night, put mask on and secure in place.  Lie down in bed with full pressure and roll to one side, adjust headgear while in that position to eliminate any leaks. Repeat process rolling to other side.     The mask seal does not have to be perfect:   CPAP machines are designed to make up  for small leaks. However, you will not tolerate leaks blowing in your eyes so you will need to adjust.   Any leak should only be near or at the bottom of the mask.  We expect your mask to leak slightly at night.    Do not over-tighten the headgear straps, tighter IS NOT better, we expect minimal leak.    First try re-positioning the mask or headgear before tightening the headgear straps.  Mask leaks are expected due to changing sleeping positions. Try pulling the mask away from your skin allowing the cushion to re-inflate will minimize the leak.  If you struggle for a good fit, try turning the CPAP off and then readjust the mask by pulling it away from your face and then turning back on the CPAP.        Humidifier tips  Humidifiers can be adjusted to increase or decrease the amount of moisture according to your comfort level. You may need to adjust this frequently at first, but then might only change it with seasonal weather changes.     Try INCREASING the humidity if:  You experience a dry, irritated nasal passage or throat.  You have a runny, drippy nose or sneezing fits after using CPAP.  You experience nasal congestion during or after CPAP use.    Try DECREASING the humidity if:  You have excessive condensation or  rain out  in the tubing or mask.  Otherwise keep the tubing warm during the night by running it underneath the blankets or pillow.      Clinic visit after initial PAP set-up   Bring your equipment with you to your 5-8 week follow up clinic visit.  We will be extracting your data from the machine if not available from the cloud based modem.        Travel  Always take your equipment with you when you travel.  If you fly with your equipment bring it on with you as a carry on.  Medical equipment does not count as a carry on.    If you travel international the machines take 110-240v.  The only adapter needed is the adapter that will fit into the receptacle (outlet).    You may also want to bring an  extension cord as many hotel rooms have limited outlets at the bedside.  Do not travel with water in your humidifier chamber.     Cleaning and Maintenance Guidelines    Equipment Frequency Cleaning Method   Mask First Day    Daily      Weekly Soak mask in hot soapy water for 30 minutes, rinse and air dry.  Wipe nasal cushion with a hot soapy (Ivory, baby shampoo) cloth and rinse.  Baby wipes may also be used.  Do not use anti-bacterial soaps,Susan  liquid soap, rubbing alcohol, bleach or ammonia.  Wash frame in hot soapy water (Ivory, baby shampoo) rinse and let air dry   Headgear Biweekly Wash in hot soapy water, rinse and air dry   Reusable Gray Filter Weekly Wash in hot soapy water, rinse, put in towel squeeze moisture out, let air dry   Disposable White Filter Check Weekly Replace when brown or gray in color; at least every 2 to 3 months   Humidifier Chamber Daily    Weekly Empty distilled water from humidifier and let air dry    Hand wash in hot soapy water, rinse and air dry   Tubing Weekly Wash in hot soapy water, rinse and let air dry   Mask, Tubing and Humidifier Chamber As needed Disinfect: Soak in 1 part distilled white vinegar to 3 parts hot water for 30 minutes, rinse well and air dry  Not the material headgear        MASK AND SUPPLY REORDERING and EQUIPMENT NEEDS through your DME and per your insurance  Reminder: Most insurance companies will allow for a new mask, headgear, tubing, and reusable gray filter every six months.  Disposable white ultra-fine filters are covered monthly.      HOME AND SAFETY INSTRUCTIONS    Do not use frayed or cracked electrical cords, multi plug adaptors, or switched receptacles    Do not immerse electrical equipment into water    Assure that electrical cords do not become a tripping hazard      Your BMI is Body mass index is 37.66 kg/m .  Weight management is a personal decision.  If you are interested in exploring weight loss strategies, the following discussion covers the  approaches that may be successful. Body mass index (BMI) is one way to tell whether you are at a healthy weight, overweight, or obese. It measures your weight in relation to your height.  A BMI of 18.5 to 24.9 is in the healthy range. A person with a BMI of 25 to 29.9 is considered overweight, and someone with a BMI of 30 or greater is considered obese. More than two-thirds of American adults are considered overweight or obese.  Being overweight or obese increases the risk for further weight gain. Excess weight may lead to heart disease and diabetes.  Creating and following plans for healthy eating and physical activity may help you improve your health.  Weight control is part of healthy lifestyle and includes exercise, emotional health, and healthy eating habits. Careful eating habits lifelong are the mainstay of weight control. Though there are significant health benefits from weight loss, long-term weight loss with diet alone may be very difficult to achieve- studies show long-term success with dietary management in less than 10% of people. Attaining a healthy weight may be especially difficult to achieve in those with severe obesity. In some cases, medications, devices and surgical management might be considered.  What can you do?  If you are overweight or obese and are interested in methods for weight loss, you should discuss this with your provider.     Consider reducing daily calorie intake by 500 calories.     Keep a food journal.     Avoiding skipping meals, consider cutting portions instead.    Diet combined with exercise helps maintain muscle while optimizing fat loss. Strength training is particularly important for building and maintaining muscle mass. Exercise helps reduce stress, increase energy, and improves fitness. Increasing exercise without diet control, however, may not burn enough calories to loose weight.       Start walking three days a week 10-20 minutes at a time    Work towards walking  thirty minutes five days a week     Eventually, increase the speed of your walking for 1-2 minutes at time    And look into health and wellness programs that may be available through your health insurance provider, employer, local community center, or ella club.    Weight management plan: Patient was referred to their PCP to discuss a diet and exercise plan.

## 2022-02-15 NOTE — TELEPHONE ENCOUNTER
Spoke with patient spouse, set time to pickup new CPAP next week 2/22/22  At 1:00 pm [facility].    JAVIER Barboza Coordinator

## 2022-02-25 NOTE — PROGRESS NOTES
Patient was offered choice of vendor and chose [facility].  Patient Quintin Paniagua was set up at [facility]  on February 25, 2022. Patient received a Resmed Airsense 11 Pressures were set at 12-14 cm H2O.   Patient s ramp is off cm H2O for Auto and FLEX/EPR is 2.  Patient received a Resmed Mask name: Dang  Pillow mask size Medium, Large, heated tubing and heated humidifier.  Patient does need to meet compliance. Patient has a follow up on 4/22/22 with Dr. Rodgers.    Swapna Phillip

## 2022-02-28 NOTE — PROGRESS NOTES
3 day Sleep therapy management telephone visit    Diagnostic AHI: 29  PSG    Confirmed with patient at time of call- N/A Patient is still interested in STM service       Message left for patient to return call        Objective data     Order Settings for PAP  CPAP min 12    CPAP max 14   Device settings from machine CPAP min 12.0     CPAP max 14.0      EPR Setting THREE    RESMED soft response  OFF     Assessment: Nightly usage over four hours      Action plan: Patient to have 14 day STM visit. Patient has a follow up visit scheduled:   yes within 61-90 days of set up    Replacement device: Yes  STM ordered by provider: Yes     Total time spent on accessing and  interpreting remote patient PAP therapy data  5 minutes    Total time spent counseling, coaching  and reviewing PAP therapy data with patient  1 minutes    [id number] no

## 2022-03-14 NOTE — PROGRESS NOTES
14  DAY STM VISIT    Diagnostic AHI: 29  PSG    Message left for patient to return call     Assessment: Pt not meeting objective benchmarks for leak      Action plan: waiting for patient to return call.       Device type: Auto-CPAP    PAP settings: CPAP min 12.0 cm  H20       CPAP max 14.0 cm  H20      CPAP fixed 13.0 cm  H20      95th% pressure 12.6 cm  H20        RESMED EPR level Setting: THREE    RESMED Soft response setting:  OFF    Mask type:  Nasal Pillows    Objective measures: 14 day rolling measures      Compliance  100 %      Leak  64.96  lpm  last  upload      AHI 3.07   last  upload      Average number of minutes 593      Objective measure goal  Compliance   Goal >70%  Leak   Goal < 24 lpm  AHI  Goal < 5  Usage  Goal >240        Total time spent on accessing and interpreting remote patient PAP therapy data  3 minutes    Total time spent counseling, coaching  and reviewing PAP therapy data with patient  1 minutes    [id number]  [id number]  no (3 day STM)

## 2022-03-30 NOTE — PROGRESS NOTES
30 DAY [facility] VISIT    Diagnostic AHI: 29  PSG    Message left for patient to return call     Assessment: Pt not meeting objective benchmarks for leak    Action plan: waiting for patient to return call.   Patient has scheduled a follow up visit with Dr. Rodgers on 4/22/22.   Device type: Auto-CPAP  PAP settings: CPAP min 12.0 cm  H20     CPAP max 14.0 cm  H20    CPAP fixed 13.0 cm  H20    95th% pressure 12.7 cm  H20      RESMED EPR level Setting: THREE    RESMED Soft response setting:  OFF  Mask type:  Nasal Pillows  Objective measures: 14 day rolling measures      Compliance  100 %      Leak  60.64 lpm  last  upload      AHI 4.74   last  upload      Average number of minutes 616      Objective measure goal  Compliance   Goal >70%  Leak   Goal < 24 lpm  AHI  Goal < 5  Usage  Goal >240        Total time spent on accessing and interpreting remote patient PAP therapy data  3 minutes    Total time spent counseling, coaching  and reviewing PAP therapy data with patient  1 minutes     [id number] this call  [id number] no  at 3 or 14 day [facility]

## 2022-04-22 NOTE — PROGRESS NOTES
Quintin is a 76 year old who is being evaluated via a billable video visit.      How would you like to obtain your AVS? MyChart  If the video visit is dropped, the invitation should be resent by: Send to e-mail at: b  Will anyone else be joining your video visit? Yes, Estephania-wife    Flu Vaccine: 9/25/21    Eva Caballero, Virtual Facilitator         Video Start Time: 11:02 AM  Video-Visit Details    Type of service:  Video Visit    Video End Time:11:13 AM    Originating Location (pt. Location): Home    Distant Location (provider location):  [facility] SLEEP [facility]     Platform used for Video Visit: BrittanyStandard Media Index     Chief complaint: Follow-up replacement machine    History of Present Illness: 72-year-old gentleman with history of severe obstructive sleep apnea, end-stage renal disease on dialysis.  He had a malfunctioning machine and needed a replacement.  Previous download also showed suboptimally treated AHI.    He has received his replacement machine and is very happy with it.  He likes it very much.  He prefers this warmer air to the cool air that he been getting previously.  He also is very comfortable with the pressures.  General he is sleeping well  No new sleep concerns.      No upcoming surgeries or procedures planned.  No major changes to his health.  Runge Sleepiness Scale  Total score - Runge: 5 (4/22/2022 10:48 AM)   (Less than 10 normal)    Insomnia Severity Scale  JUDITH Total Score: 4  (normal 0-7, mild 8-14, moderate 15-21, severe 22-28)    Past Medical History:   Diagnosis Date     Anemia due to blood loss, acute 11/19/2012     Calculus in bladder 4/9/2012     Cardiac arrest (H) 6/24/2013    Overview:  S/P CPR and defibrillation with an AED with ROSC.      Diabetes (H)      Hypertension      Kidney cancer, primary, with metastasis from kidney to other site (H)      Sepsis 11/14/2012    infected right TKA     UTI (urinary tract infection) 8/25/2013       Allergies   Allergen Reactions      Liraglutide      Other reaction(s): GI Upset       Current Outpatient Medications   Medication     acetaminophen (TYLENOL) 325 MG tablet     aspirin EC 81 MG tablet     atorvastatin (LIPITOR) 80 MG tablet     bumetanide (BUMEX) 2 MG tablet     carvedilol (COREG) 25 MG tablet     finasteride (PROSCAR) 5 MG tablet     insulin aspart (NOVOLOG PEN) 100 UNIT/ML pen     insulin degludec (TRESIBA) 200 UNIT/ML pen     Miconazole Nitrate 2 % OINT     nitroglycerin (NITROSTAT) 0.4 MG SL tablet     nystatin (MYCOSTATIN) 976522 UNIT/ML suspension     order for DME     penicillin V (VEETID) 500 MG tablet     sertraline (ZOLOFT) 50 MG tablet     tamsulosin (FLOMAX) 0.4 MG capsule     warfarin ANTICOAGULANT (COUMADIN) 5 MG tablet     cloNIDine (CATAPRES) 0.2 MG tablet     insulin pen needle 31G X 8 MM     isosorbide mononitrate (IMDUR) 60 MG 24 hr tablet     lisinopril (PRINIVIL,ZESTRIL) 20 MG tablet     NOVOLOG FLEXPEN 100 UNIT/ML soln     No current facility-administered medications for this visit.       Social History     Socioeconomic History     Marital status:      Spouse name: Estephania     Number of children: Not on file     Years of education: Not on file     Highest education level: Not on file   Occupational History     Occupation: Retired     Employer: [employer]     Comment: previous  and    Tobacco Use     Smoking status: Former Smoker     Smokeless tobacco: Never Used   Substance and Sexual Activity     Alcohol use: Yes     Drug use: No     Sexual activity: Not on file   Other Topics Concern     Parent/sibling w/ CABG, MI or angioplasty before 65F 55M? Not Asked   Social History Narrative     Not on file     Social Determinants of Health     Financial Resource Strain: Not on file   Food Insecurity: Not on file   Transportation Needs: Not on file   Physical Activity: Not on file   Stress: Not on file   Social Connections: Not on file   Intimate Partner Violence: Not  "on file   Housing Stability: Not on file       Family History   Problem Relation Age of Onset     Heart Disease Mother      Heart Disease Father            EXAM:  Ht 1.803 m (5' 11\")   Wt 122.5 kg (270 lb)   BMI 37.66 kg/m    GENERAL: Alert and no distress, hard of hearing  EYES: Eyes grossly normal to inspection.  No discharge or erythema, or obvious scleral/conjunctival abnormalities.  RESP: No audible wheeze, cough, or visible cyanosis.  No visible retractions or increased work of breathing.    SKIN: Visible skin clear. No significant rash, abnormal pigmentation or lesions.  NEURO: Cranial nerves grossly intact.  Mentation and speech appropriate for age.  PSYCH: Mentation appears normal, affect normal, judgement and insight intact, normal speech and appearance well-groomed.       PSG 3/12/2010 [facility]  Weight 335 lbs, BMI 44.2  AHI 29.3, RDI 53.4, no REM sleep on diagnostic  Lowest O2 sat 89%  CPAP 12    ResMed   Auto-PAP 12.0 - 14.0 cmH2O 30 day usage data:    100% of days with > 4 hours of use. 0/30 days with no use.   Average use 628 minutes per day.   95%ile Leak 62.28 L/min.   CPAP 95% pressure 12.8 cm.   AHI 3.13 events per hour.     ASSESSMENT:  76-year-old gentleman with severe obstructive sleep apnea, end-stage renal disease on dialysis, recently received replacement machine.  He is getting excellent clinical benefit and meeting compliance goals.  Pressure change effective at normalizing AHI.  Ongoing treatment of obstructive sleep apnea is medically indicated.    PLAN:  No changes recommended to his current settings.  Patient is can continue to use PAP therapy all night every night.  Contact me if new issues arise.  Otherwise follow-up in 1 year.      24 minutes spent on the date of the encounter doing chart review, history and exam, documentation and further activities per the note    Delfina Rodgers M.D.  Pulmonary/Critical Care/Sleep Medicine    [facility] Sleep [facility] - " [facility]   Floor 1, [address]   [address] [place] [address]   Appointments: [phone]    The above note was dictated using voice recognition software and may include typographical errors. Please contact the author for any clarifications.

## 2022-04-22 NOTE — PATIENT INSTRUCTIONS
For general sleep health questions:   http://sleepeducation.org    For tips about PAP and COVID-19:  https://www.thoracic.org/patients/patient-resources/resources/covid-19-and-home-pap-therapy.pdf    For general info about COVID-19 including vaccines:  https://O[employer].org/covid19      Continue PAP therapy every night, for all hours that you are sleeping (including naps.)  As always, try to get at least 8 hours of sleep or more each day, keep a regular sleep schedule, and avoid sleep deprivation. Avoid alcohol.  Reasons that you might need a change to your pressure therapy would be weight gain or loss, waking having inadvertently removed your PAP overnight, having previously felt refreshed by sleep with CPAP use and now waking un-refreshed, and return of daytime sleepiness. Also, the development of new medical problems  (such as heart failure, stroke, medications such as narcotics) can sometimes affect breathing at night and change your PAP therapy needs.  Please bring PAP with you if you are hospitalized.  If anticipating surgery be sure to discuss with your surgeon that you have sleep apnea and use PAP therapy.    Maintain your equipment as recommended which includes routine cleaning and replacement of supplies.      Call DME for any questions regarding supplies or maintenance.    [facility], [facility] [phone]    Do not drive on engage in potentially dangerous activities if feeling sleepy.  Please follow up in sleep clinic again in 12 months.        Tips for your PAP use-    Mask fitting tips  Mask fitting exercise:    To improve your mask seal and your mobility at night, put mask on and secure in place.  Lie down in bed with full pressure and roll to one side, adjust headgear while in that position to eliminate any leaks. Repeat process rolling to other side.     The mask seal does not have to be perfect:   CPAP machines are designed to make up for small  leaks. However, you will not tolerate leaks blowing in your eyes so you will need to adjust.   Any leak should only be near or at the bottom of the mask.  We expect your mask to leak slightly at night.    Do not over-tighten the headgear straps, tighter IS NOT better, we expect minimal leak.    First try re-positioning the mask or headgear before tightening the headgear straps.  Mask leaks are expected due to changing sleeping positions. Try pulling the mask away from your skin allowing the cushion to re-inflate will minimize the leak.  If you struggle for a good fit, try turning the CPAP off and then readjust the mask by pulling it away from your face and then turning back on the CPAP.        Humidifier tips  Humidifiers can be adjusted to increase or decrease the amount of moisture according to your comfort level. You may need to adjust this frequently at first, but then might only change it with seasonal weather changes.     Try INCREASING the humidity if:  You experience a dry, irritated nasal passage or throat.  You have a runny, drippy nose or sneezing fits after using CPAP.  You experience nasal congestion during or after CPAP use.    Try DECREASING the humidity if:  You have excessive condensation or  rain out  in the tubing or mask.  Otherwise keep the tubing warm during the night by running it underneath the blankets or pillow.      Clinic visit after initial PAP set-up   Bring your equipment with you to your 5-8 week follow up clinic visit.  We will be extracting your data from the machine if not available from the cloud based modem.        Travel  Always take your equipment with you when you travel.  If you fly with your equipment bring it on with you as a carry on.  Medical equipment does not count as a carry on.    If you travel international the machines take 110-240v.  The only adapter needed is the adapter that will fit into the receptacle (outlet).    You may also want to bring an extension cord as  many hotel rooms have limited outlets at the bedside.  Do not travel with water in your humidifier chamber.     Cleaning and Maintenance Guidelines    Equipment Frequency Cleaning Method   Mask First Day    Daily      Weekly Soak mask in hot soapy water for 30 minutes, rinse and air dry.  Wipe nasal cushion with a hot soapy (Ivory, baby shampoo) cloth and rinse.  Baby wipes may also be used.  Do not use anti-bacterial soaps,Susan  liquid soap, rubbing alcohol, bleach or ammonia.  Wash frame in hot soapy water (Ivory, baby shampoo) rinse and let air dry   Headgear Biweekly Wash in hot soapy water, rinse and air dry   Reusable Gray Filter Weekly Wash in hot soapy water, rinse, put in towel squeeze moisture out, let air dry   Disposable White Filter Check Weekly Replace when brown or gray in color; at least every 2 to 3 months   Humidifier Chamber Daily    Weekly Empty distilled water from humidifier and let air dry    Hand wash in hot soapy water, rinse and air dry   Tubing Weekly Wash in hot soapy water, rinse and let air dry   Mask, Tubing and Humidifier Chamber As needed Disinfect: Soak in 1 part distilled white vinegar to 3 parts hot water for 30 minutes, rinse well and air dry  Not the material headgear        MASK AND SUPPLY REORDERING and EQUIPMENT NEEDS through your DME and per your insurance  Reminder: Most insurance companies will allow for a new mask, headgear, tubing, and reusable gray filter every six months.  Disposable white ultra-fine filters are covered monthly.      HOME AND SAFETY INSTRUCTIONS  Do not use frayed or cracked electrical cords, multi plug adaptors, or switched receptacles  Do not immerse electrical equipment into water  Assure that electrical cords do not become a tripping hazard

## 2022-05-01 ENCOUNTER — HEALTH MAINTENANCE LETTER (OUTPATIENT)
Age: 77
End: 2022-05-01

## (undated) DEVICE — MALYUGIN RING

## (undated) DEVICE — SOL NACL 0.9% IRRIG 1000ML BOTTLE 07138-09

## (undated) DEVICE — SOL WATER IRRIG 1000ML BOTTLE 07139-09

## (undated) RX ORDER — TROPICAMIDE 10 MG/ML
SOLUTION/ DROPS OPHTHALMIC
Status: DISPENSED
Start: 2020-02-10

## (undated) RX ORDER — PHENYLEPHRINE HYDROCHLORIDE 25 MG/ML
SOLUTION/ DROPS OPHTHALMIC
Status: DISPENSED
Start: 2020-01-20

## (undated) RX ORDER — CYCLOPENTOLATE HYDROCHLORIDE 10 MG/ML
SOLUTION/ DROPS OPHTHALMIC
Status: DISPENSED
Start: 2020-01-20

## (undated) RX ORDER — TROPICAMIDE 10 MG/ML
SOLUTION/ DROPS OPHTHALMIC
Status: DISPENSED
Start: 2020-01-20

## (undated) RX ORDER — CYCLOPENTOLATE HYDROCHLORIDE 10 MG/ML
SOLUTION/ DROPS OPHTHALMIC
Status: DISPENSED
Start: 2020-02-10

## (undated) RX ORDER — LIDOCAINE HYDROCHLORIDE 10 MG/ML
INJECTION, SOLUTION EPIDURAL; INFILTRATION; INTRACAUDAL; PERINEURAL
Status: DISPENSED
Start: 2020-01-20

## (undated) RX ORDER — PHENYLEPHRINE HYDROCHLORIDE 25 MG/ML
SOLUTION/ DROPS OPHTHALMIC
Status: DISPENSED
Start: 2020-02-10

## (undated) RX ORDER — PROPOFOL 10 MG/ML
INJECTION, EMULSION INTRAVENOUS
Status: DISPENSED
Start: 2020-01-20